# Patient Record
Sex: MALE | Race: WHITE | NOT HISPANIC OR LATINO | Employment: OTHER | URBAN - METROPOLITAN AREA
[De-identification: names, ages, dates, MRNs, and addresses within clinical notes are randomized per-mention and may not be internally consistent; named-entity substitution may affect disease eponyms.]

---

## 2023-07-04 DIAGNOSIS — I10 BENIGN ESSENTIAL HTN: Primary | ICD-10-CM

## 2023-07-04 DIAGNOSIS — K76.0 FATTY LIVER: ICD-10-CM

## 2023-07-04 DIAGNOSIS — E03.8 SUBCLINICAL HYPOTHYROIDISM: ICD-10-CM

## 2023-07-04 DIAGNOSIS — Z12.5 PROSTATE CANCER SCREENING: ICD-10-CM

## 2023-07-04 DIAGNOSIS — E78.5 HYPERLIPIDEMIA, UNSPECIFIED HYPERLIPIDEMIA TYPE: ICD-10-CM

## 2023-07-04 PROBLEM — I82.402 DEEP VEIN THROMBOSIS (DVT) OF LEFT LOWER EXTREMITY (MULTI): Status: ACTIVE | Noted: 2023-07-04

## 2023-07-04 PROBLEM — I26.99 PULMONARY EMBOLISM (MULTI): Status: ACTIVE | Noted: 2023-07-04

## 2023-07-04 PROBLEM — H40.9 GLAUCOMA: Status: ACTIVE | Noted: 2023-07-04

## 2023-07-04 PROBLEM — F41.9 ANXIETY: Status: ACTIVE | Noted: 2023-07-04

## 2023-07-04 PROBLEM — D64.9 ANEMIA: Status: ACTIVE | Noted: 2023-07-04

## 2023-07-04 RX ORDER — APIXABAN 5 MG/1
5 TABLET, FILM COATED ORAL 2 TIMES DAILY
COMMUNITY
End: 2024-01-15 | Stop reason: SDUPTHER

## 2023-07-04 RX ORDER — LISINOPRIL 10 MG/1
10 TABLET ORAL DAILY
COMMUNITY
End: 2023-07-11 | Stop reason: SDUPTHER

## 2023-07-04 RX ORDER — LATANOPROST 50 UG/ML
1 SOLUTION/ DROPS OPHTHALMIC
COMMUNITY

## 2023-07-04 RX ORDER — ATORVASTATIN CALCIUM 20 MG/1
20 TABLET, FILM COATED ORAL DAILY
Qty: 14 TABLET | Refills: 0 | Status: SHIPPED | OUTPATIENT
Start: 2023-07-04 | End: 2023-07-11 | Stop reason: SDUPTHER

## 2023-07-04 RX ORDER — ATORVASTATIN CALCIUM 20 MG/1
20 TABLET, FILM COATED ORAL DAILY
COMMUNITY
End: 2023-07-04 | Stop reason: SDUPTHER

## 2023-07-08 ENCOUNTER — LAB (OUTPATIENT)
Dept: LAB | Facility: LAB | Age: 58
End: 2023-07-08
Payer: COMMERCIAL

## 2023-07-08 DIAGNOSIS — E78.5 HYPERLIPIDEMIA, UNSPECIFIED HYPERLIPIDEMIA TYPE: ICD-10-CM

## 2023-07-08 DIAGNOSIS — Z12.5 PROSTATE CANCER SCREENING: ICD-10-CM

## 2023-07-08 DIAGNOSIS — K76.0 FATTY LIVER: ICD-10-CM

## 2023-07-08 DIAGNOSIS — E03.8 SUBCLINICAL HYPOTHYROIDISM: ICD-10-CM

## 2023-07-08 DIAGNOSIS — I10 BENIGN ESSENTIAL HTN: ICD-10-CM

## 2023-07-08 LAB
ALANINE AMINOTRANSFERASE (SGPT) (U/L) IN SER/PLAS: 18 U/L (ref 10–52)
ALBUMIN (G/DL) IN SER/PLAS: 5.1 G/DL (ref 3.4–5)
ALKALINE PHOSPHATASE (U/L) IN SER/PLAS: 51 U/L (ref 33–120)
ANION GAP IN SER/PLAS: 16 MMOL/L (ref 10–20)
APPEARANCE, URINE: CLEAR
ASPARTATE AMINOTRANSFERASE (SGOT) (U/L) IN SER/PLAS: 20 U/L (ref 9–39)
BILIRUBIN DIRECT (MG/DL) IN SER/PLAS: 0.1 MG/DL (ref 0–0.3)
BILIRUBIN TOTAL (MG/DL) IN SER/PLAS: 0.7 MG/DL (ref 0–1.2)
BILIRUBIN, URINE: NEGATIVE
BLOOD, URINE: NEGATIVE
CALCIUM (MG/DL) IN SER/PLAS: 10 MG/DL (ref 8.6–10.6)
CARBON DIOXIDE, TOTAL (MMOL/L) IN SER/PLAS: 24 MMOL/L (ref 21–32)
CHLORIDE (MMOL/L) IN SER/PLAS: 104 MMOL/L (ref 98–107)
CHOLESTEROL (MG/DL) IN SER/PLAS: 183 MG/DL (ref 0–199)
CHOLESTEROL IN HDL (MG/DL) IN SER/PLAS: 64.7 MG/DL
CHOLESTEROL/HDL RATIO: 2.8
COLOR, URINE: YELLOW
CREATININE (MG/DL) IN SER/PLAS: 0.99 MG/DL (ref 0.5–1.3)
ERYTHROCYTE DISTRIBUTION WIDTH (RATIO) BY AUTOMATED COUNT: 13.7 % (ref 11.5–14.5)
ERYTHROCYTE MEAN CORPUSCULAR HEMOGLOBIN CONCENTRATION (G/DL) BY AUTOMATED: 32.8 G/DL (ref 32–36)
ERYTHROCYTE MEAN CORPUSCULAR VOLUME (FL) BY AUTOMATED COUNT: 92 FL (ref 80–100)
ERYTHROCYTES (10*6/UL) IN BLOOD BY AUTOMATED COUNT: 5.01 X10E12/L (ref 4.5–5.9)
GFR MALE: 89 ML/MIN/1.73M2
GLUCOSE (MG/DL) IN SER/PLAS: 91 MG/DL (ref 74–99)
GLUCOSE, URINE: NEGATIVE MG/DL
HEMATOCRIT (%) IN BLOOD BY AUTOMATED COUNT: 46.1 % (ref 41–52)
HEMOGLOBIN (G/DL) IN BLOOD: 15.1 G/DL (ref 13.5–17.5)
KETONES, URINE: NEGATIVE MG/DL
LDL: 102 MG/DL (ref 0–99)
LEUKOCYTE ESTERASE, URINE: NEGATIVE
LEUKOCYTES (10*3/UL) IN BLOOD BY AUTOMATED COUNT: 9.1 X10E9/L (ref 4.4–11.3)
NITRITE, URINE: NEGATIVE
NRBC (PER 100 WBCS) BY AUTOMATED COUNT: 0 /100 WBC (ref 0–0)
PH, URINE: 5 (ref 5–8)
PLATELETS (10*3/UL) IN BLOOD AUTOMATED COUNT: 232 X10E9/L (ref 150–450)
POTASSIUM (MMOL/L) IN SER/PLAS: 4.5 MMOL/L (ref 3.5–5.3)
PROSTATE SPECIFIC ANTIGEN,SCREEN: 2.55 NG/ML (ref 0–4)
PROTEIN TOTAL: 7.4 G/DL (ref 6.4–8.2)
PROTEIN, URINE: NEGATIVE MG/DL
SODIUM (MMOL/L) IN SER/PLAS: 139 MMOL/L (ref 136–145)
SPECIFIC GRAVITY, URINE: 1.02 (ref 1–1.03)
THYROTROPIN (MIU/L) IN SER/PLAS BY DETECTION LIMIT <= 0.05 MIU/L: 2.57 MIU/L (ref 0.44–3.98)
THYROXINE (T4) FREE (NG/DL) IN SER/PLAS: 1.02 NG/DL (ref 0.78–1.48)
TRIGLYCERIDE (MG/DL) IN SER/PLAS: 84 MG/DL (ref 0–149)
TRIIODOTHYRONINE (T3) (NG/DL) IN SER/PLAS: 93 NG/DL (ref 60–200)
UREA NITROGEN (MG/DL) IN SER/PLAS: 13 MG/DL (ref 6–23)
UROBILINOGEN, URINE: <2 MG/DL (ref 0–1.9)
VLDL: 17 MG/DL (ref 0–40)

## 2023-07-08 PROCEDURE — 80048 BASIC METABOLIC PNL TOTAL CA: CPT

## 2023-07-08 PROCEDURE — 84443 ASSAY THYROID STIM HORMONE: CPT

## 2023-07-08 PROCEDURE — 84153 ASSAY OF PSA TOTAL: CPT

## 2023-07-08 PROCEDURE — 85027 COMPLETE CBC AUTOMATED: CPT

## 2023-07-08 PROCEDURE — 84480 ASSAY TRIIODOTHYRONINE (T3): CPT

## 2023-07-08 PROCEDURE — 36415 COLL VENOUS BLD VENIPUNCTURE: CPT

## 2023-07-08 PROCEDURE — 80061 LIPID PANEL: CPT

## 2023-07-08 PROCEDURE — 81003 URINALYSIS AUTO W/O SCOPE: CPT

## 2023-07-08 PROCEDURE — 80076 HEPATIC FUNCTION PANEL: CPT

## 2023-07-08 PROCEDURE — 84439 ASSAY OF FREE THYROXINE: CPT

## 2023-07-10 NOTE — PROGRESS NOTES
"Subjective   Patient ID: Jorge Cheng is a 58 y.o. male who presents for Annual Exam.    HPI   Patient's health is described as good.  Regular dental visits: Yes.  Dental hygiene (brushing/flossing) regularly performed: Yes.  Corrective lenses: Yes.  Vision problems: No.  Last eye exam within 1 year: Yes.  Hearing loss: No.  Requests audiology referral: No.  Immunizations up to date: No (declined shingrix).  Healthy diet: Yes.  Regular exercise: No.  Trying to lose weight: No.  Requests nutrition/weight loss referral: No.  Sexually active: No.  Using contraception: N/A.  Requests STD screening: No.  Colon cancer screening up to date: No (prefers cologuard).  Hepatitis C screening up to date: Yes.  Lung cancer screening up to date: No (declined CT chest).    H/O HLD, HTN.  Condition(s) stable.  Taking med(s) as directed.  Requests refills   Labs obtained prior to visit.     Review of Systems  No acute complaints.    Objective   /78   Pulse 77   Resp 14   Ht 1.613 m (5' 3.5\")   Wt 58.1 kg (128 lb)   SpO2 98%   BMI 22.32 kg/m²     Physical Exam  Constitutional:       General: He is not in acute distress.     Appearance: He is normal weight.   HENT:      Head: Normocephalic.      Left Ear: Tympanic membrane normal.      Mouth/Throat:      Pharynx: Oropharynx is clear. No oropharyngeal exudate or posterior oropharyngeal erythema.   Eyes:      Extraocular Movements: Extraocular movements intact.      Conjunctiva/sclera: Conjunctivae normal.      Pupils: Pupils are equal, round, and reactive to light.   Neck:      Thyroid: No thyromegaly.      Vascular: No carotid bruit.   Cardiovascular:      Rate and Rhythm: Normal rate and regular rhythm.      Heart sounds: Normal heart sounds. No murmur heard.     No friction rub. No gallop.   Pulmonary:      Effort: Pulmonary effort is normal.      Breath sounds: Normal breath sounds. No wheezing, rhonchi or rales.   Abdominal:      General: Bowel sounds are normal. " There is no distension.      Palpations: Abdomen is soft. There is no mass.      Tenderness: There is no abdominal tenderness. There is no guarding or rebound.   Genitourinary:     Comments: Declined prostate exam.  Lymphadenopathy:      Cervical: No cervical adenopathy.   Skin:     Coloration: Skin is not jaundiced or pale.   Neurological:      General: No focal deficit present.      Mental Status: He is oriented to person, place, and time.   Psychiatric:         Mood and Affect: Mood normal.         Behavior: Behavior normal.     Assessment/Plan   Diagnoses and all orders for this visit:  Annual physical exam  Primary hypertension  -     lisinopril 10 mg tablet; Take 1 tablet (10 mg) by mouth once daily.  Hyperlipidemia, unspecified hyperlipidemia type  -     atorvastatin (Lipitor) 20 mg tablet; Take 1 tablet (20 mg) by mouth once daily.  Deep vein thrombosis (DVT) of left lower extremity, unspecified chronicity, unspecified vein (CMS/HCC)        -     Tx by hematology  Pulmonary embolism, unspecified chronicity, unspecified pulmonary embolism type, unspecified whether acute cor pulmonale present (CMS/HCC)        -     Tx by hematology  Colon cancer screening  -     Cologuard® colon cancer screening; Future    Reviewed lab results.  Refilled medications.  Albumin slightly elevated (possibly secondary to volume depletion due to fasting status).  Cologuard ordered.    F/U 6 months: Med refills.

## 2023-07-11 ENCOUNTER — OFFICE VISIT (OUTPATIENT)
Dept: PRIMARY CARE | Facility: CLINIC | Age: 58
End: 2023-07-11
Payer: COMMERCIAL

## 2023-07-11 VITALS
DIASTOLIC BLOOD PRESSURE: 78 MMHG | HEART RATE: 77 BPM | OXYGEN SATURATION: 98 % | BODY MASS INDEX: 21.85 KG/M2 | HEIGHT: 64 IN | SYSTOLIC BLOOD PRESSURE: 116 MMHG | WEIGHT: 128 LBS | RESPIRATION RATE: 14 BRPM

## 2023-07-11 DIAGNOSIS — I82.402 DEEP VEIN THROMBOSIS (DVT) OF LEFT LOWER EXTREMITY, UNSPECIFIED CHRONICITY, UNSPECIFIED VEIN (MULTI): ICD-10-CM

## 2023-07-11 DIAGNOSIS — Z00.00 ANNUAL PHYSICAL EXAM: Primary | ICD-10-CM

## 2023-07-11 DIAGNOSIS — I26.99 PULMONARY EMBOLISM, UNSPECIFIED CHRONICITY, UNSPECIFIED PULMONARY EMBOLISM TYPE, UNSPECIFIED WHETHER ACUTE COR PULMONALE PRESENT (MULTI): ICD-10-CM

## 2023-07-11 DIAGNOSIS — Z12.11 COLON CANCER SCREENING: ICD-10-CM

## 2023-07-11 DIAGNOSIS — E78.5 HYPERLIPIDEMIA, UNSPECIFIED HYPERLIPIDEMIA TYPE: ICD-10-CM

## 2023-07-11 DIAGNOSIS — I10 PRIMARY HYPERTENSION: ICD-10-CM

## 2023-07-11 PROBLEM — D64.9 ANEMIA: Status: RESOLVED | Noted: 2023-07-04 | Resolved: 2023-07-11

## 2023-07-11 PROBLEM — E03.8 SUBCLINICAL HYPOTHYROIDISM: Status: RESOLVED | Noted: 2023-07-04 | Resolved: 2023-07-11

## 2023-07-11 PROCEDURE — 3078F DIAST BP <80 MM HG: CPT | Performed by: FAMILY MEDICINE

## 2023-07-11 PROCEDURE — 99396 PREV VISIT EST AGE 40-64: CPT | Performed by: FAMILY MEDICINE

## 2023-07-11 PROCEDURE — 99214 OFFICE O/P EST MOD 30 MIN: CPT | Performed by: FAMILY MEDICINE

## 2023-07-11 PROCEDURE — 3074F SYST BP LT 130 MM HG: CPT | Performed by: FAMILY MEDICINE

## 2023-07-11 PROCEDURE — 1036F TOBACCO NON-USER: CPT | Performed by: FAMILY MEDICINE

## 2023-07-11 RX ORDER — ATORVASTATIN CALCIUM 20 MG/1
20 TABLET, FILM COATED ORAL DAILY
Qty: 90 TABLET | Refills: 1 | Status: SHIPPED | OUTPATIENT
Start: 2023-07-11 | End: 2024-01-16 | Stop reason: SDUPTHER

## 2023-07-11 RX ORDER — LISINOPRIL 10 MG/1
10 TABLET ORAL DAILY
Qty: 90 TABLET | Refills: 1 | Status: SHIPPED | OUTPATIENT
Start: 2023-07-11 | End: 2024-01-24 | Stop reason: SDUPTHER

## 2023-07-11 ASSESSMENT — PATIENT HEALTH QUESTIONNAIRE - PHQ9
2. FEELING DOWN, DEPRESSED OR HOPELESS: NOT AT ALL
1. LITTLE INTEREST OR PLEASURE IN DOING THINGS: NOT AT ALL
SUM OF ALL RESPONSES TO PHQ9 QUESTIONS 1 AND 2: 0

## 2023-07-11 NOTE — PATIENT INSTRUCTIONS
Reviewed lab results.  Refilled medications.  Albumin slightly elevated (possibly secondary to volume depletion due to fasting status).  Cologuard ordered.    F/U 6 months: Med refills.

## 2023-09-24 PROBLEM — K76.9 LIVER LESION: Status: ACTIVE | Noted: 2023-09-24

## 2023-09-24 PROBLEM — K76.89 LIVER CYST: Status: ACTIVE | Noted: 2023-09-24

## 2023-09-24 RX ORDER — MULTIVIT-MIN/IRON FUM/FOLIC AC 7.5 MG-4
1 TABLET ORAL DAILY
COMMUNITY
Start: 2020-06-18

## 2023-09-24 RX ORDER — LANOLIN ALCOHOL/MO/W.PET/CERES
CREAM (GRAM) TOPICAL
COMMUNITY
End: 2024-01-24 | Stop reason: ALTCHOICE

## 2023-10-22 ASSESSMENT — PATIENT HEALTH QUESTIONNAIRE - PHQ9
SUM OF ALL RESPONSES TO PHQ QUESTIONS 1-9: 2
5. POOR APPETITE OR OVEREATING: NOT AT ALL
10. IF YOU CHECKED OFF ANY PROBLEMS, HOW DIFFICULT HAVE THESE PROBLEMS MADE IT FOR YOU TO DO YOUR WORK, TAKE CARE OF THINGS AT HOME, OR GET ALONG WITH OTHER PEOPLE: NOT DIFFICULT AT ALL
7. TROUBLE CONCENTRATING ON THINGS, SUCH AS READING THE NEWSPAPER OR WATCHING TELEVISION: NOT AT ALL
2. FEELING DOWN, DEPRESSED, IRRITABLE, OR HOPELESS: 0
6. FEELING BAD ABOUT YOURSELF - OR THAT YOU ARE A FAILURE OR HAVE LET YOURSELF OR YOUR FAMILY DOWN: 0
9. THOUGHTS THAT YOU WOULD BE BETTER OFF DEAD, OR OF HURTING YOURSELF: NOT AT ALL
1. LITTLE INTEREST OR PLEASURE IN DOING THINGS: NOT AT ALL
5. POOR APPETITE OR OVEREATING: 0
3. TROUBLE FALLING OR STAYING ASLEEP OR SLEEPING TOO MUCH: 1
9. THOUGHTS THAT YOU WOULD BE BETTER OFF DEAD, OR OF HURTING YOURSELF: NOT AT ALL
9. THOUGHTS THAT YOU WOULD BE BETTER OFF DEAD, OR OF HURTING YOURSELF: 0
1. LITTLE INTEREST OR PLEASURE IN DOING THINGS: NOT AT ALL
8. MOVING OR SPEAKING SO SLOWLY THAT OTHER PEOPLE COULD HAVE NOTICED. OR THE OPPOSITE, BEING SO FIGETY OR RESTLESS THAT YOU HAVE BEEN MOVING AROUND A LOT MORE THAN USUAL: NOT AT ALL
3. TROUBLE FALLING OR STAYING ASLEEP OR SLEEPING TOO MUCH: SEVERAL DAYS
4. FEELING TIRED OR HAVING LITTLE ENERGY: SEVERAL DAYS
6. FEELING BAD ABOUT YOURSELF - OR THAT YOU ARE A FAILURE OR HAVE LET YOURSELF OR YOUR FAMILY DOWN: NOT AT ALL
4. FEELING TIRED OR HAVING LITTLE ENERGY: SEVERAL DAYS
3. TROUBLE FALLING OR STAYING ASLEEP OR SLEEPING TOO MUCH: SEVERAL DAYS
SUM OF ALL RESPONSES TO PHQ QUESTIONS 1-9: 2
7. TROUBLE CONCENTRATING ON THINGS, SUCH AS READING THE NEWSPAPER OR WATCHING TELEVISION: NOT AT ALL
8. MOVING OR SPEAKING SO SLOWLY THAT OTHER PEOPLE COULD HAVE NOTICED. OR THE OPPOSITE, BEING SO FIGETY OR RESTLESS THAT YOU HAVE BEEN MOVING AROUND A LOT MORE THAN USUAL: NOT AT ALL
5. POOR APPETITE OR OVEREATING: NOT AT ALL
6. FEELING BAD ABOUT YOURSELF - OR THAT YOU ARE A FAILURE OR HAVE LET YOURSELF OR YOUR FAMILY DOWN: NOT AT ALL
7. TROUBLE CONCENTRATING ON THINGS, SUCH AS READING THE NEWSPAPER OR WATCHING TELEVISION: 0
4. FEELING TIRED OR HAVING LITTLE ENERGY: 1
1. LITTLE INTEREST OR PLEASURE IN DOING THINGS: 0
2. FEELING DOWN, DEPRESSED, IRRITABLE, OR HOPELESS: NOT AT ALL
8. MOVING OR SPEAKING SO SLOWLY THAT OTHER PEOPLE COULD HAVE NOTICED. OR THE OPPOSITE, BEING SO FIGETY OR RESTLESS THAT YOU HAVE BEEN MOVING AROUND A LOT MORE THAN USUAL: 0
2. FEELING DOWN, DEPRESSED OR HOPELESS: NOT AT ALL
10. IF YOU CHECKED OFF ANY PROBLEMS, HOW DIFFICULT HAVE THESE PROBLEMS MADE IT FOR YOU TO DO YOUR WORK, TAKE CARE OF THINGS AT HOME, OR GET ALONG WITH OTHER PEOPLE: NOT DIFFICULT AT ALL

## 2023-10-24 ENCOUNTER — OFFICE VISIT (OUTPATIENT)
Dept: HEMATOLOGY/ONCOLOGY | Facility: CLINIC | Age: 58
End: 2023-10-24
Payer: COMMERCIAL

## 2023-10-24 VITALS
TEMPERATURE: 97.2 F | WEIGHT: 127.87 LBS | SYSTOLIC BLOOD PRESSURE: 120 MMHG | DIASTOLIC BLOOD PRESSURE: 74 MMHG | HEART RATE: 66 BPM | BODY MASS INDEX: 22.3 KG/M2 | RESPIRATION RATE: 20 BRPM | OXYGEN SATURATION: 99 %

## 2023-10-24 DIAGNOSIS — I26.99 PULMONARY EMBOLISM WITHOUT ACUTE COR PULMONALE, UNSPECIFIED CHRONICITY, UNSPECIFIED PULMONARY EMBOLISM TYPE (MULTI): Primary | ICD-10-CM

## 2023-10-24 PROCEDURE — 99213 OFFICE O/P EST LOW 20 MIN: CPT | Performed by: INTERNAL MEDICINE

## 2023-10-24 PROCEDURE — 3074F SYST BP LT 130 MM HG: CPT | Performed by: INTERNAL MEDICINE

## 2023-10-24 PROCEDURE — 3078F DIAST BP <80 MM HG: CPT | Performed by: INTERNAL MEDICINE

## 2023-10-24 PROCEDURE — 1036F TOBACCO NON-USER: CPT | Performed by: INTERNAL MEDICINE

## 2023-10-24 ASSESSMENT — PAIN SCALES - GENERAL: PAINLEVEL: 0-NO PAIN

## 2023-10-24 NOTE — PROGRESS NOTES
LOCATION:  [facility].     HEMATOLOGY & ONCOLOGY PROBLEMS:  1.  Bilateral pulmonary embolism and left leg DVT in Jan 2022       a.  History of heavy alcohol use and generalized debility around that time.       b.  Hypercoagulable work-up - heterozygous factor V Leiden & prothrombin gene mutations in Oct 2022.         c.  Maintained on lifelong anticoagulation with Eliquis.     CHIEF COMPLAINT:    The patient is in the clinic today for management of PE/DVT/coagulopathy and for continuation of anticoagulation.     HISTORY:   Mr. Cheng is a 57 year old pleasant gentleman with history of small bilateral PE and left leg DVT in January 2022.  Patient apparently has a history of alcoholism and related problems.  He was having issues with binge drinking around that time and  had was feeling poorly with much decreased physical activity.  He presented to the [facility] with complaint of left leg swelling with further work-up confirming left leg DVT and small bilateral PE.  He has been maintained on anticoagulation with  Eliquis since then.  No previous history of thromboembolic complication.  Family history is unremarkable but as per the patient he is father is on prophylactic Eliquis.  He is not sure about the exact reason.  He was maintained on anticoagulation for  about 6 months.  Follow-up Doppler ultrasound showed improvement in the clot burden.  Eliquis was stopped for some time and a complete hypercoagulable work-up from October 2022 showed surprise finding of heterozygous factor V Leiden and a also prothrombin  gene mutations.  Plan is for lifelong anticoagulation.     INTERVAL HISTORY:  He denies any new complaints.  He has quit drinking completely.  No history of nausea, vomiting, fever, night sweats, diarrhea, rash, anorexia or weight loss.  As stated above complete hypercoagulable work-up results shows compound heterozygous factor  V Leiden and prothrombin gene mutations.      PAST MEDICAL HISTORY:   1.  PE/DVT as detailed above.   2.  Hypertension   3.  Anxiety/depression   4.  Glaucoma     SOCIAL HISTORY:   He is single and lives alone in [place].  Quit smoking around 2017.  1 pack/day for 25 years prior smoking history.  History of heavy alcohol abuse.  Quit drinking since February 2022.  He work as a .  Born and raised in [facility] [place].     FAMILY HISTORY:    Father age 86 in good health.  He is on prophylactic Eliquis.  Patient not aware of the exact reason.  Mother age 85 in good health.  1 sister alive and well.  He does not have any children. No other specific history of bleeding, clotting or malignant  disorder in the family.     REVIEW OF SYSTEMS:  Pertinent finding as per the history above.  All other systems have been reviewed and generally negative and noncontributory.     PHYSICAL EXAMINATION:    Detailed examination not done.     Allergies and Intolerances:       Allergies:         No Known Allergies: Active     Outpatient Medication Profile:  * Patient Currently Takes Medications as of 27-Sep-2022 10:48 documented in Structured Notes         apixaban 5 mg oral tablet : 1 tab(s) orally 2 times a day , Start Date: 27-Sep-2022         lisinopril 10 mg oral tablet: 1 tab(s) orally once a day , Start Date:  27-Jul-2022         latanoprost 0.005% ophthalmic solution: to each affected eye once a day  (at bedtime) OU         atorvastatin 20 mg oral tablet: 1 tab(s) orally once a day        Lab Results:  Last 3 sets of blood work were reviewed and the trend was noted.     Radiology Result:  [facility] LAB Venous Duplex Ultrasound for DVT [Sep 23 2022  9:11AM]  Conclusion:  CONCLUSIONS:  Right Lower Venous: No evidence of acute deep vein thrombus visualized in the right lower extremity.  Left Lower Venous: There is age indeterminate deep vein thrombosis visualized in the iliac vein. There are chronic changes visualized in the common femoral and proximal  femoral veins.  Comparison:  Compared with study from 6/22/2022, no significant change in the right leg. The thrombus in the left proximal femoral vein appears to be chronic on today exam, otherwise, no significant change.     TH CT Angio Chest for PE [Jun 22 2022  9:44AM]  Impression:  Unremarkable CT scan of the chest.     Assessment and Plan:   1.  PE/DVT. Please refer to the details of initial presentation and management as outlined above. In summary, patient with issues with intermittent heavy alcohol  abuse.  He was having episodes of binge drinking and was feeling poorly with decreased physical activity around Jan 2022.  He was evaluated at [facility] ER with complaint of left leg swelling with further work-up per confirmed left leg DVT and small  bilateral pulmonary embolism.  He has been maintained on Eliquis since then.  No previous personal history of bleeding, clotting or malignant disorder. Family history is questionable.  His father is on prophylactic Eliquis but patient is not aware of the exact reason.  He was maintained on anticoagulation for about 6 months.  Follow-up Doppler ultrasound showed improvement in the clot burden.  Eliquis was stopped for some time and a complete hypercoagulable work-up from October 2022 showed surprise findings of heterozygous factor V Leiden and a also prothrombin gene mutation.  Plan is for lifelong anticoagulation.     Long discussion with the patient and his explained about the hypercoagulable blood work results especially regarding the finding of  2 separate heterozygous mutations.  In view of the genetic results and his history of DVT, I think he is at increased  risk for future complication.  Recommendations are for lifelong anticoagulation.  He is well aware of the slight increased risk of bleeding and other complications with anticoagulation.  He will also share the results with his sister.     2. Follow up:  Patient will return to the clinic in 1 year.   Advised to contact us immediately if there are any new questions or problems.     This note has been transcribed using Dragon voice recognition system and there is a possibility of unintentional typing misprints.

## 2023-10-24 NOTE — LETTER
October 24, 2023       No Recipients    Patient: Jorge Cheng   YOB: 1965   Date of Visit: 10/24/2023       Dear Dr. Mortensen Recipients:    Thank you for referring Jorge Cheng to me for evaluation. Below are my notes for this consultation.  If you have questions, please do not hesitate to call me. I look forward to following your patient along with you.       Sincerely,     Demetrio Canela MD      CC:   No Recipients  ______________________________________________________________________________________    LOCATION:  [facility].     HEMATOLOGY & ONCOLOGY PROBLEMS:  1.  Bilateral pulmonary embolism and left leg DVT in Jan 2022       a.  History of heavy alcohol use and generalized debility around that time.       b.  Hypercoagulable work-up - heterozygous factor V Leiden & prothrombin gene mutations in Oct 2022.         c.  Maintained on lifelong anticoagulation with Eliquis.     CHIEF COMPLAINT:    The patient is in the clinic today for management of PE/DVT/coagulopathy and for continuation of anticoagulation.     HISTORY:   Mr. Cheng is a 57 year old pleasant gentleman with history of small bilateral PE and left leg DVT in January 2022.  Patient apparently has a history of alcoholism and related problems.  He was having issues with binge drinking around that time and  had was feeling poorly with much decreased physical activity.  He presented to the [facility] with complaint of left leg swelling with further work-up confirming left leg DVT and small bilateral PE.  He has been maintained on anticoagulation with  Eliquis since then.  No previous history of thromboembolic complication.  Family history is unremarkable but as per the patient he is father is on prophylactic Eliquis.  He is not sure about the exact reason.  He was maintained on anticoagulation for  about 6 months.  Follow-up Doppler ultrasound showed improvement in the clot burden.  Eliquis was  stopped for some time and a complete hypercoagulable work-up from October 2022 showed surprise finding of heterozygous factor V Leiden and a also prothrombin  gene mutations.  Plan is for lifelong anticoagulation.     INTERVAL HISTORY:  He denies any new complaints.  He has quit drinking completely.  No history of nausea, vomiting, fever, night sweats, diarrhea, rash, anorexia or weight loss.  As stated above complete hypercoagulable work-up results shows compound heterozygous factor  V Leiden and prothrombin gene mutations.     PAST MEDICAL HISTORY:   1.  PE/DVT as detailed above.   2.  Hypertension   3.  Anxiety/depression   4.  Glaucoma     SOCIAL HISTORY:   He is single and lives alone in [place].  Quit smoking around 2017.  1 pack/day for 25 years prior smoking history.  History of heavy alcohol abuse.  Quit drinking since February 2022.  He work as a .  Born and raised in [facility].     FAMILY HISTORY:    Father age 86 in good health.  He is on prophylactic Eliquis.  Patient not aware of the exact reason.  Mother age 85 in good health.  1 sister alive and well.  He does not have any children. No other specific history of bleeding, clotting or malignant  disorder in the family.     REVIEW OF SYSTEMS:  Pertinent finding as per the history above.  All other systems have been reviewed and generally negative and noncontributory.     PHYSICAL EXAMINATION:    Detailed examination not done.     Allergies and Intolerances:       Allergies:         No Known Allergies: Active     Outpatient Medication Profile:  * Patient Currently Takes Medications as of 27-Sep-2022 10:48 documented in Structured Notes         apixaban 5 mg oral tablet : 1 tab(s) orally 2 times a day , Start Date: 27-Sep-2022         lisinopril 10 mg oral tablet: 1 tab(s) orally once a day , Start Date:  27-Jul-2022         latanoprost 0.005% ophthalmic solution: to each affected eye once a day  (at bedtime) OU          atorvastatin 20 mg oral tablet: 1 tab(s) orally once a day        Lab Results:  Last 3 sets of blood work were reviewed and the trend was noted.     Radiology Result:  [facility] LAB Venous Duplex Ultrasound for DVT [Sep 23 2022  9:11AM]  Conclusion:  CONCLUSIONS:  Right Lower Venous: No evidence of acute deep vein thrombus visualized in the right lower extremity.  Left Lower Venous: There is age indeterminate deep vein thrombosis visualized in the iliac vein. There are chronic changes visualized in the common femoral and proximal femoral veins.  Comparison:  Compared with study from 6/22/2022, no significant change in the right leg. The thrombus in the left proximal femoral vein appears to be chronic on today exam, otherwise, no significant change.     TH CT Angio Chest for PE [Jun 22 2022  9:44AM]  Impression:  Unremarkable CT scan of the chest.     Assessment and Plan:   1.  PE/DVT. Please refer to the details of initial presentation and management as outlined above. In summary, patient with issues with intermittent heavy alcohol  abuse.  He was having episodes of binge drinking and was feeling poorly with decreased physical activity around Jan 2022.  He was evaluated at [facility] ER with complaint of left leg swelling with further work-up per confirmed left leg DVT and small  bilateral pulmonary embolism.  He has been maintained on Eliquis since then.  No previous personal history of bleeding, clotting or malignant disorder. Family history is questionable.  His father is on prophylactic Eliquis but patient is not aware of the exact reason.  He was maintained on anticoagulation for about 6 months.  Follow-up Doppler ultrasound showed improvement in the clot burden.  Eliquis was stopped for some time and a complete hypercoagulable work-up from October 2022 showed surprise findings of heterozygous factor V Leiden and a also prothrombin gene mutation.  Plan is for lifelong anticoagulation.     Long discussion with  the patient and his explained about the hypercoagulable blood work results especially regarding the finding of  2 separate heterozygous mutations.  In view of the genetic results and his history of DVT, I think he is at increased  risk for future complication.  Recommendations are for lifelong anticoagulation.  He is well aware of the slight increased risk of bleeding and other complications with anticoagulation.  He will also share the results with his sister.     2. Follow up:  Patient will return to the clinic in 1 year.  Advised to contact us immediately if there are any new questions or problems.     This note has been transcribed using Dragon voice recognition system and there is a possibility of unintentional typing misprints.

## 2024-01-15 DIAGNOSIS — I82.402 DEEP VEIN THROMBOSIS (DVT) OF LEFT LOWER EXTREMITY, UNSPECIFIED CHRONICITY, UNSPECIFIED VEIN (MULTI): ICD-10-CM

## 2024-01-15 RX ORDER — APIXABAN 5 MG/1
5 TABLET, FILM COATED ORAL 2 TIMES DAILY
Qty: 180 TABLET | Refills: 3 | Status: SHIPPED | OUTPATIENT
Start: 2024-01-15

## 2024-01-16 ENCOUNTER — TELEPHONE (OUTPATIENT)
Dept: PRIMARY CARE | Facility: CLINIC | Age: 59
End: 2024-01-16
Payer: COMMERCIAL

## 2024-01-16 DIAGNOSIS — E78.5 HYPERLIPIDEMIA, UNSPECIFIED HYPERLIPIDEMIA TYPE: ICD-10-CM

## 2024-01-16 RX ORDER — ATORVASTATIN CALCIUM 20 MG/1
20 TABLET, FILM COATED ORAL DAILY
Qty: 7 TABLET | Refills: 0 | Status: SHIPPED | OUTPATIENT
Start: 2024-01-16 | End: 2024-01-24 | Stop reason: SDUPTHER

## 2024-01-16 NOTE — TELEPHONE ENCOUNTER
Pt called and scheduled 1/24 appt.  Pt would like a refill of     Atorvastin  20 mg  1 daily  5 left  [facility]

## 2024-01-24 ENCOUNTER — OFFICE VISIT (OUTPATIENT)
Dept: PRIMARY CARE | Facility: CLINIC | Age: 59
End: 2024-01-24
Payer: COMMERCIAL

## 2024-01-24 VITALS
BODY MASS INDEX: 22.13 KG/M2 | HEART RATE: 77 BPM | SYSTOLIC BLOOD PRESSURE: 110 MMHG | OXYGEN SATURATION: 98 % | WEIGHT: 129.6 LBS | RESPIRATION RATE: 16 BRPM | TEMPERATURE: 97.6 F | HEIGHT: 64 IN | DIASTOLIC BLOOD PRESSURE: 73 MMHG

## 2024-01-24 DIAGNOSIS — F41.9 ANXIETY: ICD-10-CM

## 2024-01-24 DIAGNOSIS — G47.00 INSOMNIA, UNSPECIFIED TYPE: ICD-10-CM

## 2024-01-24 DIAGNOSIS — I10 PRIMARY HYPERTENSION: Primary | ICD-10-CM

## 2024-01-24 DIAGNOSIS — E78.5 HYPERLIPIDEMIA, UNSPECIFIED HYPERLIPIDEMIA TYPE: ICD-10-CM

## 2024-01-24 DIAGNOSIS — E88.09 HYPERALBUMINEMIA: ICD-10-CM

## 2024-01-24 PROCEDURE — 1036F TOBACCO NON-USER: CPT | Performed by: FAMILY MEDICINE

## 2024-01-24 PROCEDURE — 99214 OFFICE O/P EST MOD 30 MIN: CPT | Performed by: FAMILY MEDICINE

## 2024-01-24 PROCEDURE — 3078F DIAST BP <80 MM HG: CPT | Performed by: FAMILY MEDICINE

## 2024-01-24 PROCEDURE — 3074F SYST BP LT 130 MM HG: CPT | Performed by: FAMILY MEDICINE

## 2024-01-24 RX ORDER — ATORVASTATIN CALCIUM 20 MG/1
20 TABLET, FILM COATED ORAL DAILY
Qty: 90 TABLET | Refills: 1 | Status: SHIPPED | OUTPATIENT
Start: 2024-01-24

## 2024-01-24 RX ORDER — HYDROXYZINE PAMOATE 50 MG/1
50 CAPSULE ORAL 3 TIMES DAILY PRN
Qty: 30 CAPSULE | Refills: 0 | Status: SHIPPED | OUTPATIENT
Start: 2024-01-24 | End: 2024-03-13 | Stop reason: SDUPTHER

## 2024-01-24 RX ORDER — LISINOPRIL 10 MG/1
10 TABLET ORAL DAILY
Qty: 90 TABLET | Refills: 1 | Status: SHIPPED | OUTPATIENT
Start: 2024-01-24

## 2024-01-24 NOTE — PROGRESS NOTES
"Subjective   Patient ID: Jorge Cheng is a 58 y.o. male who presents for Med Refill.    HPI  H/O HLD, HTN.  Condition(s) stable.  Taking med(s) as directed.  Requests refills.    Requests something mild for anxiety and insomnia.  Father has advanced dementia and it can be overwhelming at times, causing some anxiety and insomnia.    Review of Systems  No other complaints.     Objective   /73   Pulse 77   Temp 36.4 °C (97.6 °F)   Resp 16   Ht 1.613 m (5' 3.5\")   Wt 58.8 kg (129 lb 9.6 oz)   SpO2 98%   BMI 22.60 kg/m²     Physical Exam  Constitutional:       General: He is not in acute distress.     Appearance: He is normal weight.   Cardiovascular:      Rate and Rhythm: Normal rate and regular rhythm.      Heart sounds: Normal heart sounds. No murmur heard.     No friction rub. No gallop.   Pulmonary:      Effort: Pulmonary effort is normal.      Breath sounds: Normal breath sounds. No wheezing, rhonchi or rales.   Neurological:      Mental Status: He is oriented to person, place, and time.   Psychiatric:         Mood and Affect: Mood normal.         Behavior: Behavior normal.     Assessment/Plan   Diagnoses and all orders for this visit:  Primary hypertension  -     Basic Metabolic Panel; Future  -     lisinopril 10 mg tablet; Take 1 tablet (10 mg) by mouth once daily.  Hyperlipidemia, unspecified hyperlipidemia type  -     atorvastatin (Lipitor) 20 mg tablet; Take 1 tablet (20 mg) by mouth once daily.  Anxiety  -     hydrOXYzine pamoate (VistariL) 50 mg capsule; Take 1 capsule (50 mg) by mouth 3 times a day as needed (anxiety, insomnia).  Insomnia, unspecified type  -     hydrOXYzine pamoate (VistariL) 50 mg capsule; Take 1 capsule (50 mg) by mouth 3 times a day as needed (anxiety, insomnia).  Hyperalbuminemia  -     Hepatic function panel; Future    Nonfasting labs (make sure adequately hydrated).  Refilled medications.    Hydroxyzine provided as needed.  Call, send message through [facility] or " return to office prn if these symptoms worsen or fail to improve as anticipated.     F/U 6 months: Annual wellness visit.

## 2024-01-24 NOTE — PATIENT INSTRUCTIONS
Nonfasting labs (make sure adequately hydrated).  Refilled medications.    Hydroxyzine provided as needed.  Call, send message through [place], or return to office prn if these symptoms worsen or fail to improve as anticipated.     F/U 6 months: Annual wellness visit.

## 2024-03-13 DIAGNOSIS — G47.00 INSOMNIA, UNSPECIFIED TYPE: ICD-10-CM

## 2024-03-13 DIAGNOSIS — F41.9 ANXIETY: ICD-10-CM

## 2024-03-13 RX ORDER — HYDROXYZINE PAMOATE 50 MG/1
50 CAPSULE ORAL 3 TIMES DAILY PRN
Qty: 90 CAPSULE | Refills: 0 | Status: SHIPPED | OUTPATIENT
Start: 2024-03-13

## 2024-07-24 ENCOUNTER — TELEPHONE (OUTPATIENT)
Dept: PRIMARY CARE | Facility: CLINIC | Age: 59
End: 2024-07-24
Payer: COMMERCIAL

## 2024-07-24 NOTE — TELEPHONE ENCOUNTER
Medication Name: Atorvastatin   Dose: 20 mg tablet   Frequency: Take 1 tablet by mouth once daily   Pharmacy: [facility]   Quantity left: 3 days left   Last appointment: 1/24/24  Last CPE:  Last MCW:  Next appointment: 8/7/24  Next CPE:  Next MCW:

## 2024-07-25 DIAGNOSIS — E78.5 HYPERLIPIDEMIA, UNSPECIFIED HYPERLIPIDEMIA TYPE: ICD-10-CM

## 2024-07-25 RX ORDER — ATORVASTATIN CALCIUM 20 MG/1
20 TABLET, FILM COATED ORAL DAILY
Qty: 14 TABLET | Refills: 0 | Status: SHIPPED | OUTPATIENT
Start: 2024-07-25

## 2024-08-07 ENCOUNTER — LAB (OUTPATIENT)
Dept: LAB | Facility: LAB | Age: 59
End: 2024-08-07
Payer: COMMERCIAL

## 2024-08-07 ENCOUNTER — APPOINTMENT (OUTPATIENT)
Dept: PRIMARY CARE | Facility: CLINIC | Age: 59
End: 2024-08-07
Payer: COMMERCIAL

## 2024-08-07 VITALS
SYSTOLIC BLOOD PRESSURE: 111 MMHG | DIASTOLIC BLOOD PRESSURE: 74 MMHG | WEIGHT: 128.8 LBS | HEART RATE: 74 BPM | HEIGHT: 64 IN | BODY MASS INDEX: 21.99 KG/M2 | OXYGEN SATURATION: 98 % | RESPIRATION RATE: 16 BRPM

## 2024-08-07 DIAGNOSIS — E78.5 HYPERLIPIDEMIA, UNSPECIFIED HYPERLIPIDEMIA TYPE: ICD-10-CM

## 2024-08-07 DIAGNOSIS — I10 PRIMARY HYPERTENSION: Primary | ICD-10-CM

## 2024-08-07 DIAGNOSIS — I10 PRIMARY HYPERTENSION: ICD-10-CM

## 2024-08-07 DIAGNOSIS — E88.09 HYPERALBUMINEMIA: ICD-10-CM

## 2024-08-07 LAB
ALBUMIN SERPL BCP-MCNC: 4.9 G/DL (ref 3.4–5)
ALP SERPL-CCNC: 61 U/L (ref 33–120)
ALT SERPL W P-5'-P-CCNC: 18 U/L (ref 10–52)
ANION GAP SERPL CALC-SCNC: 15 MMOL/L (ref 10–20)
AST SERPL W P-5'-P-CCNC: 20 U/L (ref 9–39)
BILIRUB DIRECT SERPL-MCNC: 0.2 MG/DL (ref 0–0.3)
BILIRUB SERPL-MCNC: 0.9 MG/DL (ref 0–1.2)
BUN SERPL-MCNC: 13 MG/DL (ref 6–23)
CALCIUM SERPL-MCNC: 10 MG/DL (ref 8.6–10.6)
CHLORIDE SERPL-SCNC: 101 MMOL/L (ref 98–107)
CO2 SERPL-SCNC: 26 MMOL/L (ref 21–32)
CREAT SERPL-MCNC: 1.16 MG/DL (ref 0.5–1.3)
EGFRCR SERPLBLD CKD-EPI 2021: 73 ML/MIN/1.73M*2
GLUCOSE SERPL-MCNC: 82 MG/DL (ref 74–99)
POTASSIUM SERPL-SCNC: 4.4 MMOL/L (ref 3.5–5.3)
PROT SERPL-MCNC: 7.3 G/DL (ref 6.4–8.2)
SODIUM SERPL-SCNC: 138 MMOL/L (ref 136–145)

## 2024-08-07 PROCEDURE — 3074F SYST BP LT 130 MM HG: CPT | Performed by: FAMILY MEDICINE

## 2024-08-07 PROCEDURE — 3078F DIAST BP <80 MM HG: CPT | Performed by: FAMILY MEDICINE

## 2024-08-07 PROCEDURE — 36415 COLL VENOUS BLD VENIPUNCTURE: CPT

## 2024-08-07 PROCEDURE — 3008F BODY MASS INDEX DOCD: CPT | Performed by: FAMILY MEDICINE

## 2024-08-07 PROCEDURE — 1036F TOBACCO NON-USER: CPT | Performed by: FAMILY MEDICINE

## 2024-08-07 PROCEDURE — 80053 COMPREHEN METABOLIC PANEL: CPT

## 2024-08-07 PROCEDURE — 82248 BILIRUBIN DIRECT: CPT

## 2024-08-07 PROCEDURE — 99214 OFFICE O/P EST MOD 30 MIN: CPT | Performed by: FAMILY MEDICINE

## 2024-08-07 NOTE — PATIENT INSTRUCTIONS
Get nonfasting labs as previously ordered.  Will refill medications (90 days) after reviewing lab results.    F/U 3 months: Annual wellness visit.    Lab services: [address]  Hours: M-F 6:30a-6p, Sat 8a-12p  Phone: [phone], Option 1

## 2024-08-07 NOTE — PROGRESS NOTES
"Subjective   Patient ID: Jorge Cheng is a 59 y.o. male who presents for Med Refill.    HPI  Has HLD, HTN.  Condition(s) stable.  Taking med(s) as directed.  Requests refills.    Did not return for labs (BMP, LFTs) as advised 7 months ago.    Due for annual wellness visit.    Review of Systems  No other complaints.     Objective   /74   Pulse 74   Resp 16   Ht 1.613 m (5' 3.5\")   Wt 58.4 kg (128 lb 12.8 oz)   SpO2 98%   BMI 22.46 kg/m²     Physical Exam  Constitutional:       General: He is not in acute distress.     Appearance: He is normal weight.   Cardiovascular:      Rate and Rhythm: Normal rate and regular rhythm.      Heart sounds: Normal heart sounds. No murmur heard.     No friction rub. No gallop.   Pulmonary:      Effort: Pulmonary effort is normal.      Breath sounds: Normal breath sounds. No wheezing, rhonchi or rales.   Neurological:      Mental Status: He is oriented to person, place, and time.   Psychiatric:         Mood and Affect: Mood normal.         Behavior: Behavior normal.     Assessment/Plan   Diagnoses and all orders for this visit:  Primary hypertension  Hyperlipidemia, unspecified hyperlipidemia type    Get nonfasting labs as previously ordered.  Will refill medications (90 days) after reviewing lab results.    F/U 3 months: Annual wellness visit.  "

## 2024-08-12 DIAGNOSIS — E78.5 HYPERLIPIDEMIA, UNSPECIFIED HYPERLIPIDEMIA TYPE: ICD-10-CM

## 2024-08-12 DIAGNOSIS — I10 PRIMARY HYPERTENSION: ICD-10-CM

## 2024-08-12 RX ORDER — LISINOPRIL 10 MG/1
10 TABLET ORAL DAILY
Qty: 90 TABLET | Refills: 0 | Status: SHIPPED | OUTPATIENT
Start: 2024-08-12

## 2024-08-12 RX ORDER — ATORVASTATIN CALCIUM 20 MG/1
20 TABLET, FILM COATED ORAL DAILY
Qty: 90 TABLET | Refills: 0 | Status: SHIPPED | OUTPATIENT
Start: 2024-08-12

## 2024-10-22 ENCOUNTER — OFFICE VISIT (OUTPATIENT)
Dept: HEMATOLOGY/ONCOLOGY | Facility: CLINIC | Age: 59
End: 2024-10-22
Payer: COMMERCIAL

## 2024-10-22 VITALS
DIASTOLIC BLOOD PRESSURE: 51 MMHG | SYSTOLIC BLOOD PRESSURE: 112 MMHG | WEIGHT: 129.63 LBS | OXYGEN SATURATION: 100 % | BODY MASS INDEX: 22.6 KG/M2 | RESPIRATION RATE: 20 BRPM | TEMPERATURE: 97.7 F

## 2024-10-22 DIAGNOSIS — I26.99 PULMONARY EMBOLISM WITHOUT ACUTE COR PULMONALE, UNSPECIFIED CHRONICITY, UNSPECIFIED PULMONARY EMBOLISM TYPE (MULTI): Primary | ICD-10-CM

## 2024-10-22 DIAGNOSIS — D68.52 HETEROZYGOUS FOR PROTHROMBIN G20210A MUTATION (MULTI): ICD-10-CM

## 2024-10-22 DIAGNOSIS — D68.51 HETEROZYGOUS FACTOR V LEIDEN MUTATION (MULTI): ICD-10-CM

## 2024-10-22 PROCEDURE — 3074F SYST BP LT 130 MM HG: CPT | Performed by: INTERNAL MEDICINE

## 2024-10-22 PROCEDURE — 3078F DIAST BP <80 MM HG: CPT | Performed by: INTERNAL MEDICINE

## 2024-10-22 PROCEDURE — 99213 OFFICE O/P EST LOW 20 MIN: CPT | Performed by: INTERNAL MEDICINE

## 2024-10-22 ASSESSMENT — PAIN SCALES - GENERAL: PAINLEVEL_OUTOF10: 0-NO PAIN

## 2024-10-22 NOTE — PROGRESS NOTES
LOCATION:  [facility] at [facility].     HEMATOLOGY & ONCOLOGY PROBLEMS:  1.  Bilateral pulmonary embolism and left leg DVT in Jan 2022       a.  History of heavy alcohol use and generalized debility around that time.       b.  Hypercoagulable work-up - heterozygous factor V Leiden & prothrombin gene mutations in Oct 2022.         c.   Maintained on lifelong anticoagulation with Eliquis.     CHIEF COMPLAINT:    The patient is in the clinic today for management of PE/DVT/coagulopathy and for continuation of anticoagulation.     HISTORY:   Mr. Cheng is a 59 year old pleasant gentleman with history of small bilateral PE and left leg DVT in Jan 2022.  He apparently has a history of alcoholism and related problems.  He was having issues with binge drinking around that time and  had was feeling poorly with much decreased physical activity.  He presented to the [facility] with complaint of left leg swelling with further work-up confirming left leg DVT and small bilateral PE.  He has been maintained on anticoagulation with Eliquis since then.  No previous history of thromboembolic complication.  Family history is unremarkable but as per the patient his father was on prophylactic Eliquis, probably cardiac reasons.  He is not sure about the exact details.   Follow-up Doppler ultrasound from Sep 2022, showed improvement in the clot burden.  Eliquis was stopped for some time and a complete hypercoagulable work-up from October 2022 showed surprise finding of heterozygous factor V Leiden and a also prothrombin gene mutations.  Plan is for lifelong anticoagulation.     INTERVAL HISTORY:  He denies any new complaints.  He has quit drinking completely.  No history of nausea, vomiting, fever, night sweats, diarrhea, rash, anorexia or weight loss.  As stated above complete hypercoagulable work-up results showed compound heterozygous factor  V Leiden and prothrombin gene mutations.     PAST MEDICAL HISTORY:   1.   PE/DVT as detailed above.   2.  Hypertension   3.  Anxiety/depression   4.  Glaucoma     SOCIAL HISTORY:   He is single and lives alone in [place].  Quit smoking around 2017.  1 pack/day for 25 years prior smoking history. History of heavy alcohol abuse.  Quit drinking since 2022.  He work as a .  Born and raised in [facility].     FAMILY HISTORY:    Father  at age 88 from dementia related complications.  He was on prophylactic Eliquis - mainly for cardiac reasons.  Mother age 87 in good health.  1 sister ~ alive and well.  He does not have any children. No other specific history of bleeding, clotting or malignant  disorder in the family.     REVIEW OF SYSTEMS:  Pertinent finding as per the history above.  All other systems have been reviewed and generally negative and noncontributory.     ALLERGY & MEDICATIONS:  Allergies and latest outpatient medications list were reviewed in the EMR.    VITAL SIGNS  BSA: 1.62 meters squared  /51   Temp 36.5 °C (97.7 °F)   Resp 20   Wt 58.8 kg (129 lb 10.1 oz)   SpO2 100%   BMI 22.60 kg/m²     PHYSICAL EXAMINATION:  Detailed examination not done.    LAB RESULTS:  Last 3 sets of blood work were reviewed and the trend was noted.     RADIOLOGY RESULT:  [facility] LAB Venous Duplex Ultrasound for DVT [Sep 23 2022  9:11AM]  Conclusion:  CONCLUSIONS:  Right Lower Venous: No evidence of acute deep vein thrombus visualized in the right lower extremity.  Left Lower Venous: There is age indeterminate deep vein thrombosis visualized in the iliac vein. There are chronic changes visualized in the common femoral and proximal femoral veins.  Comparison:  Compared with study from 2022, no significant change in the right leg. The thrombus in the left proximal femoral vein appears to be chronic on today exam, otherwise, no significant change.     TH CT Angio Chest for PE [2022]  Impression:  Unremarkable CT scan of the chest.     ASSESSMENT AND  PLAN:   1.  PE/DVT. Please refer to the details of initial presentation and management as outlined above. In summary, patient with issues with intermittent heavy alcohol  abuse.  He was having episodes of binge drinking and was feeling poorly with decreased physical activity around Jan 2022.  He was evaluated at [facility] with complaint of left leg swelling,  further work-up per confirmed left leg DVT and small bilateral pulmonary embolism.  He has been maintained on Eliquis since then.  No previous personal history of bleeding, clotting or malignant disorder. Family history is questionable.  His father is on prophylactic Eliquis but patient is not aware of the exact reason.  He was maintained on anticoagulation for about 6 months.  Follow-up Doppler ultrasound showed improvement in the clot burden.  Eliquis was stopped for some time and a complete hypercoagulable work-up from October 2022 showed surprise findings of heterozygous factor V Leiden and a also prothrombin gene mutation.  Plan is for lifelong anticoagulation.     Again, long discussion with the patient and he is explained about the hypercoagulable blood work results especially  the finding of  2 separate heterozygous mutations.  In view of the genetic results and his history of PE/DVT, I think he is at increased risk for future complications.  Recommendations are for lifelong anticoagulation.  He is aware of the slight increased risk of bleeding and other complications with anticoagulation.  I again advised him to share the hypercoagulable results with his sister.     2. Follow up:  Patient will return to the clinic in 1 year.  Advised to contact us immediately if there are any new questions or problems.     This note has been transcribed using Dragon voice recognition system and there is a possibility of unintentional typing misprints.

## 2024-11-04 ENCOUNTER — APPOINTMENT (OUTPATIENT)
Dept: PRIMARY CARE | Facility: CLINIC | Age: 59
End: 2024-11-04
Payer: COMMERCIAL

## 2024-11-04 VITALS
HEIGHT: 64 IN | BODY MASS INDEX: 22.33 KG/M2 | RESPIRATION RATE: 16 BRPM | SYSTOLIC BLOOD PRESSURE: 101 MMHG | OXYGEN SATURATION: 100 % | DIASTOLIC BLOOD PRESSURE: 65 MMHG | WEIGHT: 130.8 LBS | HEART RATE: 63 BPM

## 2024-11-04 DIAGNOSIS — I10 PRIMARY HYPERTENSION: ICD-10-CM

## 2024-11-04 DIAGNOSIS — Z12.5 PROSTATE CANCER SCREENING: ICD-10-CM

## 2024-11-04 DIAGNOSIS — Z00.00 ANNUAL PHYSICAL EXAM: Primary | ICD-10-CM

## 2024-11-04 DIAGNOSIS — F17.219 CIGARETTE NICOTINE DEPENDENCE WITH NICOTINE-INDUCED DISORDER: ICD-10-CM

## 2024-11-04 DIAGNOSIS — Z12.11 COLON CANCER SCREENING: ICD-10-CM

## 2024-11-04 DIAGNOSIS — K76.0 FATTY LIVER: ICD-10-CM

## 2024-11-04 DIAGNOSIS — E78.5 HYPERLIPIDEMIA, UNSPECIFIED HYPERLIPIDEMIA TYPE: ICD-10-CM

## 2024-11-04 PROCEDURE — 99214 OFFICE O/P EST MOD 30 MIN: CPT | Performed by: FAMILY MEDICINE

## 2024-11-04 PROCEDURE — 1036F TOBACCO NON-USER: CPT | Performed by: FAMILY MEDICINE

## 2024-11-04 PROCEDURE — 3074F SYST BP LT 130 MM HG: CPT | Performed by: FAMILY MEDICINE

## 2024-11-04 PROCEDURE — 3008F BODY MASS INDEX DOCD: CPT | Performed by: FAMILY MEDICINE

## 2024-11-04 PROCEDURE — 3078F DIAST BP <80 MM HG: CPT | Performed by: FAMILY MEDICINE

## 2024-11-04 PROCEDURE — 99396 PREV VISIT EST AGE 40-64: CPT | Performed by: FAMILY MEDICINE

## 2024-11-04 RX ORDER — ATORVASTATIN CALCIUM 20 MG/1
20 TABLET, FILM COATED ORAL DAILY
Qty: 7 TABLET | Refills: 0 | Status: SHIPPED | OUTPATIENT
Start: 2024-11-04 | End: 2024-11-07 | Stop reason: SDUPTHER

## 2024-11-04 RX ORDER — LISINOPRIL 5 MG/1
5 TABLET ORAL DAILY
Qty: 90 TABLET | Refills: 1 | Status: SHIPPED | OUTPATIENT
Start: 2024-11-04

## 2024-11-04 ASSESSMENT — ENCOUNTER SYMPTOMS
OCCASIONAL FEELINGS OF UNSTEADINESS: 0
LOSS OF SENSATION IN FEET: 0
DEPRESSION: 0

## 2024-11-04 ASSESSMENT — PATIENT HEALTH QUESTIONNAIRE - PHQ9
1. LITTLE INTEREST OR PLEASURE IN DOING THINGS: NOT AT ALL
SUM OF ALL RESPONSES TO PHQ9 QUESTIONS 1 AND 2: 1
2. FEELING DOWN, DEPRESSED OR HOPELESS: SEVERAL DAYS

## 2024-11-04 NOTE — PROGRESS NOTES
"Subjective   Patient ID: Jorge Cheng is a 59 y.o. male who presents for Annual Exam.    HPI   Patient's health is described as good.  Regular dental visits: No.  Dental hygiene (brushing/flossing) regularly performed: Yes.  Corrective lenses: Yes.  Vision problems: Yes (glaucoma).  Last eye exam within 1 year: Yes.  Hearing loss: No.  Requests audiology referral: No.  Immunizations up to date: No (declined shingrix, flu).   Healthy diet: Yes.  Regular exercise: Somewhat.  Trying to lose weight: No.  Requests nutrition/weight loss referral: No.  Sexually active: No.  Using contraception: N/A.  Requests STD screening: No.  Colon cancer screening up to date: No (prefers cologuard).  Lung cancer screening up to date: No.   Hepatitis C screening up to date: Yes.    Has HLD, HTN.  Taking med(s) as directed.  Requests refills.    Reviewed/Updated Active problem list, PMH, PSH, FH, SH, Meds, Allergies.    Review of Systems  Low back pain.  Will make appt to discuss if it persists.    Objective   /65   Pulse 63   Resp 16   Ht 1.613 m (5' 3.5\")   Wt 59.3 kg (130 lb 12.8 oz)   SpO2 100%   BMI 22.81 kg/m²     Physical Exam  Constitutional:       General: He is not in acute distress.     Appearance: He is normal weight.   HENT:      Head: Normocephalic.      Right Ear: Tympanic membrane normal.      Left Ear: Tympanic membrane normal.      Mouth/Throat:      Pharynx: Oropharynx is clear. No oropharyngeal exudate or posterior oropharyngeal erythema.   Eyes:      Extraocular Movements: Extraocular movements intact.      Conjunctiva/sclera: Conjunctivae normal.      Pupils: Pupils are equal, round, and reactive to light.   Neck:      Thyroid: No thyromegaly.      Vascular: No carotid bruit.   Cardiovascular:      Rate and Rhythm: Normal rate and regular rhythm.      Heart sounds: Normal heart sounds. No murmur heard.     No friction rub. No gallop.   Pulmonary:      Effort: Pulmonary effort is normal.      Breath " sounds: Normal breath sounds. No wheezing, rhonchi or rales.   Abdominal:      General: Bowel sounds are normal. There is no distension.      Palpations: Abdomen is soft. There is no mass.      Tenderness: There is no abdominal tenderness. There is no guarding or rebound.   Genitourinary:     Comments: Declined prostate exam   Lymphadenopathy:      Cervical: No cervical adenopathy.   Skin:     Coloration: Skin is not jaundiced or pale.   Neurological:      General: No focal deficit present.      Mental Status: He is oriented to person, place, and time.   Psychiatric:         Mood and Affect: Mood normal.         Behavior: Behavior normal.     Assessment/Plan   Diagnoses and all orders for this visit:  Annual physical exam  Primary hypertension  -     CBC; Future  -     Basic Metabolic Panel; Future  -     Urinalysis with Reflex Culture and Microscopic; Future  -     lisinopril 5 mg tablet; Take 1 tablet (5 mg) by mouth once daily.  Hyperlipidemia, unspecified hyperlipidemia type  -     Lipid Panel; Future  -     atorvastatin (Lipitor) 20 mg tablet; Take 1 tablet (20 mg) by mouth once daily.  Fatty liver  -     US abdomen limited liver; Future  Prostate cancer screening  -     Prostate Specific Antigen, Screen; Future  Colon cancer screening  -     Cologuard® colon cancer screening; Future  Cigarette nicotine dependence with nicotine-induced disorder  -     CT lung screening low dose; Future      Fasting labs.  Lisinopril decreased.  Limited amount of Atorvastatin provided (will provide normal amount after reviewing lab results).  Liver ultrasound ordered.  CT chest for lung cancer screening.  Cologuard ordered (call [phone] if any questions).   Recommend routine dental visits.     F/U 6 months: Med refills.

## 2024-11-04 NOTE — PATIENT INSTRUCTIONS
Fasting labs.  Lisinopril decreased.  Limited amount of Atorvastatin provided (will provide normal amount after reviewing lab results).  Liver ultrasound ordered.  CT chest for lung cancer screening.  Cologuard ordered (call [phone] if any questions).   Recommend routine dental visits.     F/U 6 months: Med refills.    Lab services: [address]  Hours: M-F 6:30a-6p, Sat 8a-12p  Phone: [phone], Option 1

## 2024-11-06 ENCOUNTER — LAB (OUTPATIENT)
Dept: LAB | Facility: LAB | Age: 59
End: 2024-11-06
Payer: COMMERCIAL

## 2024-11-06 DIAGNOSIS — I10 PRIMARY HYPERTENSION: ICD-10-CM

## 2024-11-06 DIAGNOSIS — Z12.5 PROSTATE CANCER SCREENING: ICD-10-CM

## 2024-11-06 DIAGNOSIS — E78.5 HYPERLIPIDEMIA, UNSPECIFIED HYPERLIPIDEMIA TYPE: ICD-10-CM

## 2024-11-06 LAB
ANION GAP SERPL CALC-SCNC: 14 MMOL/L (ref 10–20)
APPEARANCE UR: CLEAR
BILIRUB UR STRIP.AUTO-MCNC: NEGATIVE MG/DL
BUN SERPL-MCNC: 16 MG/DL (ref 6–23)
CALCIUM SERPL-MCNC: 9.4 MG/DL (ref 8.6–10.6)
CHLORIDE SERPL-SCNC: 102 MMOL/L (ref 98–107)
CHOLEST SERPL-MCNC: 209 MG/DL (ref 0–199)
CHOLESTEROL/HDL RATIO: 3
CO2 SERPL-SCNC: 25 MMOL/L (ref 21–32)
COLOR UR: NORMAL
CREAT SERPL-MCNC: 1.02 MG/DL (ref 0.5–1.3)
EGFRCR SERPLBLD CKD-EPI 2021: 85 ML/MIN/1.73M*2
ERYTHROCYTE [DISTWIDTH] IN BLOOD BY AUTOMATED COUNT: 13.9 % (ref 11.5–14.5)
GLUCOSE SERPL-MCNC: 91 MG/DL (ref 74–99)
GLUCOSE UR STRIP.AUTO-MCNC: NORMAL MG/DL
HCT VFR BLD AUTO: 46.8 % (ref 41–52)
HDLC SERPL-MCNC: 70.5 MG/DL
HGB BLD-MCNC: 15.1 G/DL (ref 13.5–17.5)
HOLD SPECIMEN: NORMAL
KETONES UR STRIP.AUTO-MCNC: NEGATIVE MG/DL
LDLC SERPL CALC-MCNC: 123 MG/DL
LEUKOCYTE ESTERASE UR QL STRIP.AUTO: NEGATIVE
MCH RBC QN AUTO: 29.9 PG (ref 26–34)
MCHC RBC AUTO-ENTMCNC: 32.3 G/DL (ref 32–36)
MCV RBC AUTO: 93 FL (ref 80–100)
NITRITE UR QL STRIP.AUTO: NEGATIVE
NON HDL CHOLESTEROL: 139 MG/DL (ref 0–149)
NRBC BLD-RTO: 0 /100 WBCS (ref 0–0)
PH UR STRIP.AUTO: 5.5 [PH]
PLATELET # BLD AUTO: 230 X10*3/UL (ref 150–450)
POTASSIUM SERPL-SCNC: 4.2 MMOL/L (ref 3.5–5.3)
PROT UR STRIP.AUTO-MCNC: NEGATIVE MG/DL
PSA SERPL-MCNC: 4.03 NG/ML
RBC # BLD AUTO: 5.05 X10*6/UL (ref 4.5–5.9)
RBC # UR STRIP.AUTO: NEGATIVE /UL
SODIUM SERPL-SCNC: 137 MMOL/L (ref 136–145)
SP GR UR STRIP.AUTO: 1.01
TRIGL SERPL-MCNC: 78 MG/DL (ref 0–149)
UROBILINOGEN UR STRIP.AUTO-MCNC: NORMAL MG/DL
VLDL: 16 MG/DL (ref 0–40)
WBC # BLD AUTO: 7.6 X10*3/UL (ref 4.4–11.3)

## 2024-11-06 PROCEDURE — 81003 URINALYSIS AUTO W/O SCOPE: CPT

## 2024-11-06 PROCEDURE — 85027 COMPLETE CBC AUTOMATED: CPT

## 2024-11-06 PROCEDURE — 36415 COLL VENOUS BLD VENIPUNCTURE: CPT

## 2024-11-06 PROCEDURE — 80061 LIPID PANEL: CPT

## 2024-11-06 PROCEDURE — G0103 PSA SCREENING: HCPCS

## 2024-11-06 PROCEDURE — 80048 BASIC METABOLIC PNL TOTAL CA: CPT

## 2024-11-07 DIAGNOSIS — R97.20 ELEVATED PSA: Primary | ICD-10-CM

## 2024-11-07 DIAGNOSIS — E78.5 HYPERLIPIDEMIA, UNSPECIFIED HYPERLIPIDEMIA TYPE: ICD-10-CM

## 2024-11-07 RX ORDER — ATORVASTATIN CALCIUM 20 MG/1
20 TABLET, FILM COATED ORAL DAILY
Qty: 90 TABLET | Refills: 1 | Status: SHIPPED | OUTPATIENT
Start: 2024-11-07

## 2024-11-09 ENCOUNTER — HOSPITAL ENCOUNTER (OUTPATIENT)
Dept: RADIOLOGY | Facility: HOSPITAL | Age: 59
Discharge: HOME | End: 2024-11-09
Payer: COMMERCIAL

## 2024-11-09 DIAGNOSIS — K76.0 FATTY LIVER: ICD-10-CM

## 2024-11-09 PROCEDURE — 76705 ECHO EXAM OF ABDOMEN: CPT

## 2024-11-09 PROCEDURE — 76705 ECHO EXAM OF ABDOMEN: CPT | Performed by: RADIOLOGY

## 2024-11-11 PROBLEM — K76.0 FATTY LIVER: Status: RESOLVED | Noted: 2023-07-04 | Resolved: 2024-11-11

## 2024-11-21 ENCOUNTER — HOSPITAL ENCOUNTER (OUTPATIENT)
Dept: RADIOLOGY | Facility: CLINIC | Age: 59
End: 2024-11-21
Payer: COMMERCIAL

## 2024-11-22 ENCOUNTER — HOSPITAL ENCOUNTER (OUTPATIENT)
Dept: RADIOLOGY | Facility: CLINIC | Age: 59
Discharge: HOME | End: 2024-11-22
Payer: COMMERCIAL

## 2024-11-22 DIAGNOSIS — F17.219 CIGARETTE NICOTINE DEPENDENCE WITH NICOTINE-INDUCED DISORDER: ICD-10-CM

## 2024-11-22 PROCEDURE — 71271 CT THORAX LUNG CANCER SCR C-: CPT

## 2024-11-26 DIAGNOSIS — E78.5 HYPERLIPIDEMIA, UNSPECIFIED HYPERLIPIDEMIA TYPE: Primary | ICD-10-CM

## 2024-11-26 DIAGNOSIS — I10 PRIMARY HYPERTENSION: ICD-10-CM

## 2024-11-26 DIAGNOSIS — I25.10 CORONARY ARTERY CALCIFICATION: ICD-10-CM

## 2024-12-09 NOTE — PROGRESS NOTES
Subjective   Patient ID: Jorge Cheng is a 59 y.o. male    \[facility] [place] [facility]\""  59 y.o. male who presents to [facility] for elevated PSA. He was referred by his PCP Dr. Edmonds. His most recent PSA conducted on 11/06/2024 ws 4.03 ng/mL. Prior PSA levels were 2.55 ng/mL (07/08/2023), 2.11 ng/mL (07/16/2022), and 3.20 ng/mL (06/19/2020). ***      Review of Systems    All systems were reviewed. Anything negative was noted in the HPI.    Objective   Physical Exam    General: Well developed, well nourished, alert and cooperative, appears in no acute distress   Eyes: Non-injected conjunctiva, sclera clear, no proptosis   Cardiac: Extremities are warm and well perfused. No edema, cyanosis or pallor   Lungs: Breathing is easy, non-labored. Speaking in clear and complete sentences. Normal diaphragmatic movement   MSK: Ambulatory with steady gait, unassisted   Neuro: Alert and oriented to person, place, and time   Psych: Demonstrates good judgment and reason, without hallucinations, abnormal affect or abnormal behaviors   Skin: No obvious lesions, no rashes       No CVA tenderness bilaterally   No suprapubic pain or discomfort       Past Medical History:   Diagnosis Date    Abnormal findings on diagnostic imaging of other specified body structures 07/02/2020    Abnormal CXR    Abnormal levels of other serum enzymes 07/02/2020    Elevated lipase    Encounter for follow-up examination after completed treatment for conditions other than malignant neoplasm 03/07/2022    Hospital discharge follow-up    Other disorders of electrolyte and fluid balance, not elsewhere classified 07/02/2020    Electrolyte abnormality    Other microscopic hematuria 07/05/2020    Microhematuria    Other specified abnormal findings of blood chemistry 07/02/2020    Elevated platelet count    Other specified abnormal findings of blood chemistry 07/08/2022    Elevated LFTs    Personal history of diseases of the blood and blood-forming organs and certain disorders  involving the immune mechanism 07/02/2020    History of anemia    Personal history of other diseases of the circulatory system 07/02/2020    History of hypertension    Personal history of other specified conditions 03/07/2022    History of tachycardia         Past Surgical History:   Procedure Laterality Date    OTHER SURGICAL HISTORY  06/23/2020    Appendectomy         Assessment/Plan   Elevated PSA    59 y.o. male who presents for the above condition,  We had a very long and extensive discussion with the patient regarding elevated PSA. I explained to him the pathophysiology, differential diagnosis, risk factor, associated conditions, and management. I explained to him that elevated PSA could be either due to BPH, prostate infection or inflammation or prostate adenocarcinoma. We discussed the need to do a work-up to rule out any underlying prostate adenocarcinoma in the form of MR fusion biopsy if his MRI is positive or regular TRUS biopsy of the MRI is negative or we cannot do an MRI of the prostate. We discussed at length the risk, benefit, potential complication, adverse events of prostate biopsy including pain, discomfort, urinary retention, hematuria, pneumaturia, hematospermia. Explained to him that there is a 2% to 5% risk of complicated urinary infection and urosepsis. Explained to him indicates it happens, he will need to be admitted for IV antibiotic for 2 to 3 days at the hospital.      Plan:  - ***    E&M visit today is associated with current or anticipated ongoing medical care services related to a patient's single, serious condition or a complex condition.     12/10/2024    Scribe Attestation  By signing my name below, I, ***, Scribe attest that this documentation has been prepared under the direction and in the presence of Dr. Yazan Orellana.

## 2024-12-10 ENCOUNTER — OFFICE VISIT (OUTPATIENT)
Dept: UROLOGY | Facility: HOSPITAL | Age: 59
End: 2024-12-10
Payer: COMMERCIAL

## 2024-12-10 DIAGNOSIS — R97.20 ELEVATED PSA: Primary | ICD-10-CM

## 2024-12-10 PROCEDURE — 99214 OFFICE O/P EST MOD 30 MIN: CPT | Performed by: STUDENT IN AN ORGANIZED HEALTH CARE EDUCATION/TRAINING PROGRAM

## 2024-12-10 PROCEDURE — G2211 COMPLEX E/M VISIT ADD ON: HCPCS | Performed by: STUDENT IN AN ORGANIZED HEALTH CARE EDUCATION/TRAINING PROGRAM

## 2024-12-10 PROCEDURE — 99204 OFFICE O/P NEW MOD 45 MIN: CPT | Performed by: STUDENT IN AN ORGANIZED HEALTH CARE EDUCATION/TRAINING PROGRAM

## 2024-12-10 NOTE — PROGRESS NOTES
Subjective   Patient ID: Jorge Cheng is a 59 y.o. male    [facility]  59 y.o. male who presents to [facility] for elevated PSA. He was referred by his PCP Dr. Edmonds. His most recent PSA conducted on 11/06/2024 ws 4.03 ng/mL. Prior PSA levels were 2.55 ng/mL (07/08/2023), 2.11 ng/mL (07/16/2022), and 3.20 ng/mL (06/19/2020).     The patient has had nocturia. He cut down caffeine and stopped drinking after 8 PM, and since has improved. He reports occasional discomfort in the testicles. He denies any dysuria or hematuria.       Review of Systems    All systems were reviewed. Anything negative was noted in the HPI.    Objective   Physical Exam    General: Well developed, well nourished, alert and cooperative, appears in no acute distress   Eyes: Non-injected conjunctiva, sclera clear, no proptosis   Cardiac: Extremities are warm and well perfused. No edema, cyanosis or pallor   Lungs: Breathing is easy, non-labored. Speaking in clear and complete sentences. Normal diaphragmatic movement   MSK: Ambulatory with steady gait, unassisted   Neuro: Alert and oriented to person, place, and time   Psych: Demonstrates good judgment and reason, without hallucinations, abnormal affect or abnormal behaviors   Skin: No obvious lesions, no rashes       No CVA tenderness bilaterally   No suprapubic pain or discomfort       Past Medical History:   Diagnosis Date    Abnormal findings on diagnostic imaging of other specified body structures 07/02/2020    Abnormal CXR    Abnormal levels of other serum enzymes 07/02/2020    Elevated lipase    Encounter for follow-up examination after completed treatment for conditions other than malignant neoplasm 03/07/2022    Hospital discharge follow-up    Other disorders of electrolyte and fluid balance, not elsewhere classified 07/02/2020    Electrolyte abnormality    Other microscopic hematuria 07/05/2020    Microhematuria    Other specified abnormal findings of blood chemistry 07/02/2020    Elevated  platelet count    Other specified abnormal findings of blood chemistry 07/08/2022    Elevated LFTs    Personal history of diseases of the blood and blood-forming organs and certain disorders involving the immune mechanism 07/02/2020    History of anemia    Personal history of other diseases of the circulatory system 07/02/2020    History of hypertension    Personal history of other specified conditions 03/07/2022    History of tachycardia         Past Surgical History:   Procedure Laterality Date    OTHER SURGICAL HISTORY  06/23/2020    Appendectomy         Assessment/Plan   Elevated PSA    59 y.o. male who presents for the above condition,  We had a very long and extensive discussion with the patient regarding elevated PSA. I explained to him the pathophysiology, differential diagnosis, risk factor, associated conditions, and management. I explained to him that elevated PSA could be either due to BPH, prostate infection or inflammation or prostate adenocarcinoma. We discussed the need to do a work-up to rule out any underlying prostate adenocarcinoma in the form of MR fusion biopsy if his MRI is positive or regular TRUS biopsy of the MRI is negative or we cannot do an MRI of the prostate. We discussed at length the risk, benefit, potential complication, adverse events of prostate biopsy including pain, discomfort, urinary retention, hematuria, pneumaturia, hematospermia. Explained to him that there is a 2% to 5% risk of complicated urinary infection and urosepsis. Explained to him indicates it happens, he will need to be admitted for IV antibiotic for 2 to 3 days at the hospital.      Plan:  - Will schedule for Prostate MRI. If MRI is negative, we will monitor 6 months and repeat in 6 months. If positive, we will go ahead and get a biopsy done.   - Follow up with the MRI result.    E&M visit today is associated with current or anticipated ongoing medical care services related to a patient's single, serious  condition or a complex condition.     12/10/2024    Scribe Attestation  By signing my name below, I Lelia Mcclellan, Scribrajni attest that this documentation has been prepared under the direction and in the presence of Dr. Yazan Orellana.

## 2024-12-16 LAB — NONINV COLON CA DNA+OCC BLD SCRN STL QL: NEGATIVE

## 2024-12-27 ENCOUNTER — APPOINTMENT (OUTPATIENT)
Dept: RADIOLOGY | Facility: HOSPITAL | Age: 59
End: 2024-12-27
Payer: COMMERCIAL

## 2025-01-02 DIAGNOSIS — I82.402 DEEP VEIN THROMBOSIS (DVT) OF LEFT LOWER EXTREMITY, UNSPECIFIED CHRONICITY, UNSPECIFIED VEIN (MULTI): ICD-10-CM

## 2025-01-02 RX ORDER — APIXABAN 5 MG/1
5 TABLET, FILM COATED ORAL 2 TIMES DAILY
Qty: 180 TABLET | Refills: 3 | Status: SHIPPED | OUTPATIENT
Start: 2025-01-02

## 2025-01-10 ENCOUNTER — HOSPITAL ENCOUNTER (OUTPATIENT)
Dept: RADIOLOGY | Facility: HOSPITAL | Age: 60
Discharge: HOME | End: 2025-01-10
Payer: COMMERCIAL

## 2025-01-10 DIAGNOSIS — R97.20 ELEVATED PSA: ICD-10-CM

## 2025-01-10 PROCEDURE — A9575 INJ GADOTERATE MEGLUMI 0.1ML: HCPCS | Performed by: STUDENT IN AN ORGANIZED HEALTH CARE EDUCATION/TRAINING PROGRAM

## 2025-01-10 PROCEDURE — 2550000001 HC RX 255 CONTRASTS: Performed by: STUDENT IN AN ORGANIZED HEALTH CARE EDUCATION/TRAINING PROGRAM

## 2025-01-10 PROCEDURE — 72197 MRI PELVIS W/O & W/DYE: CPT

## 2025-01-10 RX ORDER — GADOTERATE MEGLUMINE 376.9 MG/ML
12 INJECTION INTRAVENOUS
Status: COMPLETED | OUTPATIENT
Start: 2025-01-10 | End: 2025-01-10

## 2025-01-10 RX ADMIN — GADOTERATE MEGLUMINE 12 ML: 376.9 INJECTION INTRAVENOUS at 16:55

## 2025-01-16 NOTE — PROGRESS NOTES
Subjective   Patient ID: Jorge Cheng is a 59 y.o. male    [facility]  59 y.o. male who presents to [facility] for a follow-up visit with MR prostate results regarding his  elevated PSA. He was referred by his PCP Dr. Edmonds. His most recent PSA conducted on 11/06/2024 ws 4.03 ng/mL. Prior PSA levels were 2.55 ng/mL (07/08/2023), 2.11 ng/mL (07/16/2022), and 3.20 ng/mL (06/19/2020). PI-RADS 4 lesion on MR prostate conducted 01/10/2025.    The patient is currently taking Eliquis with a history of blood clots.    The most recent MR prostate, conducted on 01/10/2025, revealed:  1.  Bilateral patchy and nodular areas in the posterolateral  peripheral zone meeting criteria for PI-RADS 4, measuring up to 1.4  cm on the right and 1.0 cm on the left. No evidence of extracapsular  disease.      PI-RADS 4 - High (clinically significant cancer is likely to be  present).      Review of Systems    All systems were reviewed. Anything negative was noted in the HPI.    Objective   Physical Exam    General: Well developed, well nourished, alert and cooperative, appears in no acute distress   Eyes: Non-injected conjunctiva, sclera clear, no proptosis   Cardiac: Extremities are warm and well perfused. No edema, cyanosis or pallor   Lungs: Breathing is easy, non-labored. Speaking in clear and complete sentences. Normal diaphragmatic movement   MSK: Ambulatory with steady gait, unassisted   Neuro: Alert and oriented to person, place, and time   Psych: Demonstrates good judgment and reason, without hallucinations, abnormal affect or abnormal behaviors   Skin: No obvious lesions, no rashes       No CVA tenderness bilaterally   No suprapubic pain or discomfort       Past Medical History:   Diagnosis Date    Abnormal findings on diagnostic imaging of other specified body structures 07/02/2020    Abnormal CXR    Abnormal levels of other serum enzymes 07/02/2020    Elevated lipase    Encounter for follow-up examination after completed treatment  for conditions other than malignant neoplasm 03/07/2022    Hospital discharge follow-up    Other disorders of electrolyte and fluid balance, not elsewhere classified 07/02/2020    Electrolyte abnormality    Other microscopic hematuria 07/05/2020    Microhematuria    Other specified abnormal findings of blood chemistry 07/02/2020    Elevated platelet count    Other specified abnormal findings of blood chemistry 07/08/2022    Elevated LFTs    Personal history of diseases of the blood and blood-forming organs and certain disorders involving the immune mechanism 07/02/2020    History of anemia    Personal history of other diseases of the circulatory system 07/02/2020    History of hypertension    Personal history of other specified conditions 03/07/2022    History of tachycardia         Past Surgical History:   Procedure Laterality Date    OTHER SURGICAL HISTORY  06/23/2020    Appendectomy         Assessment/Plan   Elevated PSA, PI-RADS 4 lesion on MR    59 y.o. male who presents for the above condition,  We had a very long and extensive discussion with the patient regarding elevated PSA. I explained to him the pathophysiology, differential diagnosis, risk factor, associated conditions, and management. I explained to him that elevated PSA could be either due to BPH, prostate infection or inflammation or prostate adenocarcinoma. We discussed the need to do a work-up to rule out any underlying prostate adenocarcinoma in the form of MR fusion biopsy if his MRI is positive or regular TRUS biopsy of the MRI is negative or we cannot do an MRI of the prostate. We discussed at length the risk, benefit, potential complication, adverse events of prostate biopsy including pain, discomfort, urinary retention, hematuria, pneumaturia, hematospermia. Explained to him that there is a 2% to 5% risk of complicated urinary infection and urosepsis. Explained to him indicates it happens, he will need to be admitted for IV antibiotic for 2  to 3 days at the hospital.      - Provided pt with biopsy intervention and observation options.  - Pt elected to move forward with biopsy at this time.    - Pt reports he is currently taking Eliquis  - Pt will obtain PCP and oncology clearance to stop Eliquis 2 days before, the day of, and 2 days after biopsy.    Plan:  - Pt will call to schedule MR fusion biopsy after obtaining PCP and Oncology clearance    E&M visit today is associated with current or anticipated ongoing medical care services related to a patient's single, serious condition or a complex condition.     1/21/2025    Scribe Attestation  By signing my name below, IDelma Scribe attest that this documentation has been prepared under the direction and in the presence of Dr. Yazan Orellana.

## 2025-01-21 ENCOUNTER — OFFICE VISIT (OUTPATIENT)
Dept: UROLOGY | Facility: HOSPITAL | Age: 60
End: 2025-01-21
Payer: COMMERCIAL

## 2025-01-21 DIAGNOSIS — Z79.2 PROPHYLACTIC ANTIBIOTIC: Primary | ICD-10-CM

## 2025-01-21 PROCEDURE — G2211 COMPLEX E/M VISIT ADD ON: HCPCS | Performed by: STUDENT IN AN ORGANIZED HEALTH CARE EDUCATION/TRAINING PROGRAM

## 2025-01-21 PROCEDURE — 1036F TOBACCO NON-USER: CPT | Performed by: STUDENT IN AN ORGANIZED HEALTH CARE EDUCATION/TRAINING PROGRAM

## 2025-01-21 PROCEDURE — 99214 OFFICE O/P EST MOD 30 MIN: CPT | Performed by: STUDENT IN AN ORGANIZED HEALTH CARE EDUCATION/TRAINING PROGRAM

## 2025-01-21 RX ORDER — CIPROFLOXACIN 500 MG/1
500 TABLET ORAL 2 TIMES DAILY
Qty: 6 TABLET | Refills: 0 | Status: SHIPPED | OUTPATIENT
Start: 2025-01-21 | End: 2025-01-24

## 2025-01-21 ASSESSMENT — PATIENT HEALTH QUESTIONNAIRE - PHQ9
SUM OF ALL RESPONSES TO PHQ9 QUESTIONS 1 AND 2: 0
1. LITTLE INTEREST OR PLEASURE IN DOING THINGS: NOT AT ALL
2. FEELING DOWN, DEPRESSED OR HOPELESS: NOT AT ALL

## 2025-01-24 ENCOUNTER — PREP FOR PROCEDURE (OUTPATIENT)
Dept: UROLOGY | Facility: HOSPITAL | Age: 60
End: 2025-01-24
Payer: COMMERCIAL

## 2025-01-24 DIAGNOSIS — R97.20 PSA ELEVATION: Primary | ICD-10-CM

## 2025-01-29 ENCOUNTER — TELEPHONE (OUTPATIENT)
Dept: HEMATOLOGY/ONCOLOGY | Facility: CLINIC | Age: 60
End: 2025-01-29
Payer: COMMERCIAL

## 2025-01-29 NOTE — TELEPHONE ENCOUNTER
Clearance was sent to surgeons office for upcoming surgery. Surgeon asking we order the Lovenox bridge required for patient. Message given to Dr. Canela to order to patient's [facility]. Patient will check with [facility] if they offer teaching for injection, if not he will call me and we will get him in here to teach injection.

## 2025-01-31 ENCOUNTER — OFFICE VISIT (OUTPATIENT)
Dept: PRIMARY CARE | Facility: CLINIC | Age: 60
End: 2025-01-31
Payer: COMMERCIAL

## 2025-01-31 VITALS
HEART RATE: 68 BPM | WEIGHT: 129.4 LBS | OXYGEN SATURATION: 98 % | SYSTOLIC BLOOD PRESSURE: 113 MMHG | HEIGHT: 64 IN | BODY MASS INDEX: 22.09 KG/M2 | RESPIRATION RATE: 14 BRPM | DIASTOLIC BLOOD PRESSURE: 73 MMHG

## 2025-01-31 DIAGNOSIS — I26.99 PULMONARY EMBOLISM WITHOUT ACUTE COR PULMONALE, UNSPECIFIED CHRONICITY, UNSPECIFIED PULMONARY EMBOLISM TYPE (MULTI): Primary | ICD-10-CM

## 2025-01-31 DIAGNOSIS — Z01.810 PREOP CARDIOVASCULAR EXAM: Primary | ICD-10-CM

## 2025-01-31 PROCEDURE — 93000 ELECTROCARDIOGRAM COMPLETE: CPT | Performed by: FAMILY MEDICINE

## 2025-01-31 PROCEDURE — 3078F DIAST BP <80 MM HG: CPT | Performed by: FAMILY MEDICINE

## 2025-01-31 PROCEDURE — 99214 OFFICE O/P EST MOD 30 MIN: CPT | Performed by: FAMILY MEDICINE

## 2025-01-31 PROCEDURE — 3008F BODY MASS INDEX DOCD: CPT | Performed by: FAMILY MEDICINE

## 2025-01-31 PROCEDURE — 3074F SYST BP LT 130 MM HG: CPT | Performed by: FAMILY MEDICINE

## 2025-01-31 RX ORDER — ENOXAPARIN SODIUM 100 MG/ML
INJECTION SUBCUTANEOUS
Qty: 10 EACH | Refills: 1 | Status: SHIPPED | OUTPATIENT
Start: 2025-01-31

## 2025-01-31 NOTE — PATIENT INSTRUCTIONS
Nonfasting labs.    F/U 4 months as previously advised: Med refills.     Lab services: [address]  Hours: M-F 7:15a-6:00p  Phone: [phone], Option 1

## 2025-01-31 NOTE — PROGRESS NOTES
"Subjective   Patient ID: Jorge Cheng is a 59 y.o. male who presents for SURGICAL CLEARANCE .    HPI   Patient is here for preop cardiac risk assessment.  No acute complaints at this time.  Surgical procedure: MR fusion prostate biopsy.    Detsky's Modified Cardiac Risk Index:   Age > 70: No  MI within 6 months: No  MI after 6 months: No  Class III angina: No  Class IV angina: No  Unstable angina within 6 months: No  Pulmonary edema within 1 week: No  Pulmonary edema ever: No  Suspected critical aortic stenosis: No  EKG with rhythm other than sinus or sinus No  EKG with 5 PVCs: No  Emergency operation: No  Poor general medical status (Fernandez Multifactorial Index): {MEGYN:80869}    ACC/AHA Preoperative Cardiac Risk Index:  Major: Unstable angina Yes, recent MI No, decompensated CHF   No significant arrhythmias No, severe valvular disease No.  Intermediate: Mild angina No, prior MI No, compensated or prior CHF   No, DM No, renal insufficiency {MEGYN:73997}.  Minor: Advanced age No, abnormal EKG No, rhythm other than sinus   No, poor functional capacity No, history of stroke No, uncontrolled HTN No.  Exercise capacity: 4 mets.    I personally reviewed EKG at time of office visit (Sinus rhythm, no acute ischemic changes).   I personally reviewed nonfasting lab results (BMP, AST, ALT) after visit.    Review of Systems  No other complaints.     Objective   /73   Pulse 68   Resp 14   Ht 1.613 m (5' 3.5\")   Wt 58.7 kg (129 lb 6.4 oz)   SpO2 98%   BMI 22.56 kg/m²     Physical Exam  Constitutional:       General: He is not in acute distress.     Appearance: He is normal weight.   HENT:      Head: Normocephalic.      Right Ear: Tympanic membrane normal.      Left Ear: Tympanic membrane normal.      Mouth/Throat:      Pharynx: Oropharynx is clear. No oropharyngeal exudate or posterior oropharyngeal erythema.   Eyes:      Extraocular Movements: Extraocular movements intact.      Conjunctiva/sclera: Conjunctivae " normal.      Pupils: Pupils are equal, round, and reactive to light.   Neck:      Thyroid: No thyromegaly.      Vascular: No carotid bruit.   Cardiovascular:      Rate and Rhythm: Normal rate and regular rhythm.      Heart sounds: Normal heart sounds. No murmur heard.     No friction rub. No gallop.   Pulmonary:      Effort: Pulmonary effort is normal.      Breath sounds: Normal breath sounds. No wheezing, rhonchi or rales.   Abdominal:      General: Bowel sounds are normal. There is no distension.      Palpations: Abdomen is soft. There is no mass.      Tenderness: There is no abdominal tenderness. There is no guarding or rebound.   Lymphadenopathy:      Cervical: No cervical adenopathy.   Skin:     Coloration: Skin is not jaundiced or pale.   Neurological:      General: No focal deficit present.      Mental Status: He is oriented to person, place, and time.   Psychiatric:         Mood and Affect: Mood normal.         Behavior: Behavior normal.     Assessment/Plan   Diagnoses and all orders for this visit:  Preop cardiovascular exam  -     ECG 12 lead (Clinic Performed)  -     Basic Metabolic Panel; Future  -     Alanine Aminotransferase; Future  -     Aspartate Aminotransferase; Future    Per Detsky's Modified Cardiac Risk Index:   Patient is at {MEGLOW:37130} cardiac risk.    Per ACC/AHA Preoperative Cardiac Risk Index:  Noninvasive cardiac testing {is/is not:94426} not recommended.    F/U 4 months as previously advised: Med refills.

## 2025-02-07 LAB
ALT SERPL-CCNC: 17 U/L (ref 9–46)
ANION GAP SERPL CALCULATED.4IONS-SCNC: 16 MMOL/L (CALC) (ref 7–17)
AST SERPL-CCNC: 20 U/L (ref 10–35)
BUN SERPL-MCNC: 12 MG/DL (ref 7–25)
BUN/CREAT SERPL: NORMAL (CALC) (ref 6–22)
CALCIUM SERPL-MCNC: 9.6 MG/DL (ref 8.6–10.3)
CHLORIDE SERPL-SCNC: 103 MMOL/L (ref 98–110)
CO2 SERPL-SCNC: 20 MMOL/L (ref 20–32)
CREAT SERPL-MCNC: 1.02 MG/DL (ref 0.7–1.3)
EGFRCR SERPLBLD CKD-EPI 2021: 85 ML/MIN/1.73M2
GLUCOSE SERPL-MCNC: 77 MG/DL (ref 65–99)
POTASSIUM SERPL-SCNC: 3.7 MMOL/L (ref 3.5–5.3)
SODIUM SERPL-SCNC: 139 MMOL/L (ref 135–146)

## 2025-02-28 ENCOUNTER — CLINICAL SUPPORT (OUTPATIENT)
Dept: PREADMISSION TESTING | Facility: HOSPITAL | Age: 60
End: 2025-02-28
Payer: COMMERCIAL

## 2025-02-28 DIAGNOSIS — R97.20 PSA ELEVATION: ICD-10-CM

## 2025-02-28 NOTE — CPM/PAT NURSE NOTE
CPM/PAT Nurse Note      Name: Jorge Cheng (Jorge Mahoneyorell)  /Age: 1965/60 y.o.       Past Medical History:   Diagnosis Date    DVT (deep venous thrombosis) (Multi)     left leg, Bilateral PE remote.    Factor V Leiden (Multi)     and Prothrombin gene mutations    H/O ETOH abuse     sober since 2022    Hyperlipidemia     Hypertension     Other specified abnormal findings of blood chemistry 2020    Elevated platelet count 2024 230    Other specified abnormal findings of blood chemistry 2022    Elevated LFTs, 2024 AST 20, ALT 18, PHOS 61    Personal history of diseases of the blood and blood-forming organs and certain disorders involving the immune mechanism 2020    History of anemia, 15.1/46.8 H&H 2024    Personal history of other specified conditions 2022    History of tachycardia    Vision loss     left eye, Glaucoma       Past Surgical History:   Procedure Laterality Date    OTHER SURGICAL HISTORY  2020    Appendectomy       Patient  reports never being sexually active.    Family History   Problem Relation Name Age of Onset    No Known Problems Mother      Dementia Father      Breast cancer Sister         No Known Allergies    Prior to Admission medications    Medication Sig Start Date End Date Taking? Authorizing Provider   atorvastatin (Lipitor) 20 mg tablet Take 1 tablet (20 mg) by mouth once daily. 24   Ubaldo Edmonds MD   Eliquis 5 mg tablet Take 1 tablet (5 mg) by mouth 2 times a day. 25   Demetrio Canela MD   enoxaparin (Lovenox) 60 mg/0.6 mL syringe Every 12 hrs for 5 days prior to surgery 25   Demetrio Canela MD   hydrOXYzine pamoate (VistariL) 50 mg capsule Take 1 capsule (50 mg) by mouth 3 times a day as needed (anxiety, insomnia).  Patient not taking: Reported on 2025 3/13/24   Ubaldo Edmonds MD   latanoprost (Xalatan) 0.005 % ophthalmic solution Administer 1 drop into both eyes once daily at bedtime.    Historical  Provider, MD   lisinopril 5 mg tablet Take 1 tablet (5 mg) by mouth once daily. 11/4/24   Ubaldo Edmonds MD   multivitamin with minerals (multivit-min-iron fum-folic ac) tablet Take 1 tablet by mouth once daily. 6/18/20   Historical Provider, MD RON CORONADO     DASI Risk Score      Flowsheet Row Questionnaire Series Submission from 1/24/2025 in [facility] with Generic Provider [place]   Can you take care of yourself (eat, dress, bathe, or use toilet)?  2.75  filed at 01/24/2025 0903   Can you walk indoors, such as around your house? 1.75  filed at 01/24/2025 0903   Can you walk a block or two on level ground?  2.75  filed at 01/24/2025 0903   Can you climb a flight of stairs or walk up a hill? 5.5  filed at 01/24/2025 0903   Can you run a short distance? 8  filed at 01/24/2025 0903   Can you do light work around the house like dusting or washing dishes? 2.7  filed at 01/24/2025 0903   Can you do moderate work around the house like vacuuming, sweeping floors or carrying groceries? 3.5  filed at 01/24/2025 0903   Can you do heavy work around the house like scrubbing floors or lifting and moving heavy furniture?  8  filed at 01/24/2025 0903   Can you do yard work like raking leaves, weeding or pushing a mower? 4.5  filed at 01/24/2025 0903   Can you have sexual relations? 5.25  filed at 01/24/2025 0903   Can you participate in moderate recreational activities like golf, bowling, dancing, doubles tennis or throwing a baseball or football? 6  filed at 01/24/2025 0903   Can you participate in strenous sports like swimming, singles tennis, football, basketball, or skiing? 7.5  filed at 01/24/2025 0903   DASI SCORE 58.2  filed at 01/24/2025 0903   METS Score (Will be calculated only when all the questions are answered) 9.9  filed at 01/24/2025 0903          Caprini DVT Assessment    No data to display       Modified Frailty Index    No data to display       [id number] Stroke Risk Points  Current as of just now         N/A 0 to 9 Points:      Last Change: N/A          The TPD0TR9-LVPu risk score (Lip JULISSA, et al. 2009. © 2010 American College [employer]) quantifies the risk of stroke for a patient with atrial fibrillation. For patients without atrial fibrillation or under the age of 18 this score appears as N/A. Higher score values generally indicate higher risk of stroke.        This score is not applicable to this patient. Components are not calculated.          Revised Cardiac Risk Index    No data to display       Apfel Simplified Score    No data to display       Risk Analysis Index Results This Encounter    No data found in the last 10 encounters.       Stop Bang Score      Flowsheet Row Questionnaire Series Submission from 1/24/2025 in [facility] with Generic Provider [place]   Do you snore loudly? 0  filed at 01/24/2025 0903   Do you often feel tired or fatigued after your sleep? 0  filed at 01/24/2025 0903   Has anyone ever observed you stop breathing in your sleep? 0  filed at 01/24/2025 0903   Do you have or are you being treated for high blood pressure? 0  filed at 01/24/2025 0903   Recent BMI (Calculated) 22.8  filed at 01/24/2025 0903   Is BMI greater than 35 kg/m2? 0=No  filed at 01/24/2025 0903   Age older than 50 years old? 1=Yes  filed at 01/24/2025 0903   Gender - Male 1=Yes  filed at 01/24/2025 0903          Prodigy: High Risk  Total Score: 8              Prodigy Gender Score          ARISCAT Score for Postoperative Pulmonary Complications    No data to display       Jordan Perioperative Risk for Myocardial Infarction or Cardiac Arrest (SANTOS)    No data to display         Nurse Plan of Action:   RN screening call complete.  Reviewed allergies, medications and pharmacy, medical, surgical and social history with patient.  Chart updated.

## 2025-03-07 ENCOUNTER — APPOINTMENT (OUTPATIENT)
Dept: LAB | Facility: HOSPITAL | Age: 60
End: 2025-03-07
Payer: COMMERCIAL

## 2025-03-07 ENCOUNTER — PRE-ADMISSION TESTING (OUTPATIENT)
Dept: PREADMISSION TESTING | Facility: HOSPITAL | Age: 60
End: 2025-03-07
Payer: COMMERCIAL

## 2025-03-07 VITALS
OXYGEN SATURATION: 97 % | BODY MASS INDEX: 23.05 KG/M2 | TEMPERATURE: 97 F | DIASTOLIC BLOOD PRESSURE: 79 MMHG | HEIGHT: 63 IN | RESPIRATION RATE: 16 BRPM | HEART RATE: 75 BPM | WEIGHT: 130.07 LBS | SYSTOLIC BLOOD PRESSURE: 122 MMHG

## 2025-03-07 DIAGNOSIS — Z01.818 PREOP TESTING: Primary | ICD-10-CM

## 2025-03-07 DIAGNOSIS — Z01.818 ENCOUNTER FOR OTHER PREPROCEDURAL EXAMINATION: Primary | ICD-10-CM

## 2025-03-07 LAB
BASOPHILS # BLD AUTO: 0.06 X10*3/UL (ref 0–0.1)
BASOPHILS NFR BLD AUTO: 0.8 %
EOSINOPHIL # BLD AUTO: 0.06 X10*3/UL (ref 0–0.7)
EOSINOPHIL NFR BLD AUTO: 0.8 %
ERYTHROCYTE [DISTWIDTH] IN BLOOD BY AUTOMATED COUNT: 13.2 % (ref 11.5–14.5)
HCT VFR BLD AUTO: 44.3 % (ref 41–52)
HGB BLD-MCNC: 15.4 G/DL (ref 13.5–17.5)
IMM GRANULOCYTES # BLD AUTO: 0.01 X10*3/UL (ref 0–0.7)
IMM GRANULOCYTES NFR BLD AUTO: 0.1 % (ref 0–0.9)
LYMPHOCYTES # BLD AUTO: 2.87 X10*3/UL (ref 1.2–4.8)
LYMPHOCYTES NFR BLD AUTO: 37.7 %
MCH RBC QN AUTO: 30.7 PG (ref 26–34)
MCHC RBC AUTO-ENTMCNC: 34.8 G/DL (ref 32–36)
MCV RBC AUTO: 88 FL (ref 80–100)
MONOCYTES # BLD AUTO: 0.59 X10*3/UL (ref 0.1–1)
MONOCYTES NFR BLD AUTO: 7.7 %
NEUTROPHILS # BLD AUTO: 4.03 X10*3/UL (ref 1.2–7.7)
NEUTROPHILS NFR BLD AUTO: 52.9 %
NRBC BLD-RTO: 0 /100 WBCS (ref 0–0)
PLATELET # BLD AUTO: 212 X10*3/UL (ref 150–450)
RBC # BLD AUTO: 5.02 X10*6/UL (ref 4.5–5.9)
WBC # BLD AUTO: 7.6 X10*3/UL (ref 4.4–11.3)

## 2025-03-07 PROCEDURE — 36415 COLL VENOUS BLD VENIPUNCTURE: CPT

## 2025-03-07 PROCEDURE — 99204 OFFICE O/P NEW MOD 45 MIN: CPT | Performed by: PHYSICIAN ASSISTANT

## 2025-03-07 PROCEDURE — 85025 COMPLETE CBC W/AUTO DIFF WBC: CPT

## 2025-03-07 ASSESSMENT — ENCOUNTER SYMPTOMS
CONFUSION: 0
LIMITED RANGE OF MOTION: 0
LIGHT-HEADEDNESS: 0
VOMITING: 0
BRUISES/BLEEDS EASILY: 1
DYSPNEA WITH EXERTION: 0
UNEXPECTED WEIGHT CHANGE: 0
ABDOMINAL PAIN: 0
RHINORRHEA: 0
BLOOD IN STOOL: 0
WEAKNESS: 0
MYALGIAS: 0
DIARRHEA: 0
VISUAL CHANGE: 0
SINUS CONGESTION: 0
NUMBNESS: 0
CHILLS: 0
SHORTNESS OF BREATH: 0
ARTHRALGIAS: 0
COUGH: 0
NECK PAIN: 0
EYE PAIN: 0
TROUBLE SWALLOWING: 0
DYSURIA: 0
FEVER: 0
WHEEZING: 0
DYSPNEA AT REST: 0
SKIN CHANGES: 0
NAUSEA: 0
DOUBLE VISION: 0
PALPITATIONS: 0
ABDOMINAL DISTENTION: 0
EXCESSIVE BLEEDING: 0
HEMOPTYSIS: 0
WOUND: 0
DIFFICULTY URINATING: 0
CONSTIPATION: 0
EYE DISCHARGE: 0
NECK STIFFNESS: 0

## 2025-03-07 NOTE — CPM/PAT NURSE NOTE
CPM/PAT Nurse Note      Name: Jorge Cheng (Jorge Cheng)  /Age: 1965/60 y.o.       Past Medical History:   Diagnosis Date    DVT (deep venous thrombosis) (Multi)     left leg, Bilateral PE remote.    Factor V Leiden (Multi)     and Prothrombin gene mutations    H/O ETOH abuse     sober since 2022    Hyperlipidemia     Hypertension     Other specified abnormal findings of blood chemistry 2020    Elevated platelet count 2024 230    Other specified abnormal findings of blood chemistry 2022    Elevated LFTs, 2024 AST 20, ALT 18, PHOS 61    Personal history of diseases of the blood and blood-forming organs and certain disorders involving the immune mechanism 2020    History of anemia, 15.1/46.8 H&H 2024    Personal history of other specified conditions 2022    History of tachycardia    Vision loss     left eye, Glaucoma       Past Surgical History:   Procedure Laterality Date    OTHER SURGICAL HISTORY  2020    Appendectomy       Patient  reports never being sexually active.    Family History   Problem Relation Name Age of Onset    No Known Problems Mother      Dementia Father      Breast cancer Sister         No Known Allergies    Prior to Admission medications    Medication Sig Start Date End Date Taking? Authorizing Provider   atorvastatin (Lipitor) 20 mg tablet Take 1 tablet (20 mg) by mouth once daily. 24  Yes Ubaldo Edmonds MD   Eliquis 5 mg tablet Take 1 tablet (5 mg) by mouth 2 times a day. 25  Yes Demetrio Canela MD   latanoprost (Xalatan) 0.005 % ophthalmic solution Administer 1 drop into both eyes once daily at bedtime.   Yes Historical Provider, MD   lisinopril 5 mg tablet Take 1 tablet (5 mg) by mouth once daily. 24  Yes Ubaldo Edmonds MD   multivitamin with minerals (multivit-min-iron fum-folic ac) tablet Take 1 tablet by mouth once daily. 20  Yes Historical Provider, MD   enoxaparin (Lovenox) 60 mg/0.6 mL syringe Every  12 hrs for 5 days prior to surgery  Patient not taking: Reported on 3/7/2025 1/31/25   Demetrio Canela MD   hydrOXYzine pamoate (VistariL) 50 mg capsule Take 1 capsule (50 mg) by mouth 3 times a day as needed (anxiety, insomnia).  Patient not taking: Reported on 2/28/2025 3/13/24   Ubaldo Edmonds MD        PAT ROS     DASI Risk Score      Flowsheet Row Questionnaire Series Submission from 1/24/2025 in [facility] with Generic Provider [place]   Can you take care of yourself (eat, dress, bathe, or use toilet)?  2.75  filed at 01/24/2025 0903   Can you walk indoors, such as around your house? 1.75  filed at 01/24/2025 0903   Can you walk a block or two on level ground?  2.75  filed at 01/24/2025 0903   Can you climb a flight of stairs or walk up a hill? 5.5  filed at 01/24/2025 0903   Can you run a short distance? 8  filed at 01/24/2025 0903   Can you do light work around the house like dusting or washing dishes? 2.7  filed at 01/24/2025 0903   Can you do moderate work around the house like vacuuming, sweeping floors or carrying groceries? 3.5  filed at 01/24/2025 0903   Can you do heavy work around the house like scrubbing floors or lifting and moving heavy furniture?  8  filed at 01/24/2025 0903   Can you do yard work like raking leaves, weeding or pushing a mower? 4.5  filed at 01/24/2025 0903   Can you have sexual relations? 5.25  filed at 01/24/2025 0903   Can you participate in moderate recreational activities like golf, bowling, dancing, doubles tennis or throwing a baseball or football? 6  filed at 01/24/2025 0903   Can you participate in strenous sports like swimming, singles tennis, football, basketball, or skiing? 7.5  filed at 01/24/2025 0903   DASI SCORE 58.2  filed at 01/24/2025 0903   METS Score (Will be calculated only when all the questions are answered) 9.9  filed at 01/24/2025 0903          Caprini DVT Assessment    No data to display       Modified Frailty Index    No data to display        [id number] Stroke Risk Points  Current as of just now        N/A 0 to 9 Points:      Last Change: N/A          The [id number] risk score (Lip JULISSA, et al. 2009. © 2010 American College [employer]) quantifies the risk of stroke for a patient with atrial fibrillation. For patients without atrial fibrillation or under the age of 18 this score appears as N/A. Higher score values generally indicate higher risk of stroke.        This score is not applicable to this patient. Components are not calculated.          Revised Cardiac Risk Index    No data to display       Apfel Simplified Score    No data to display       Risk Analysis Index Results This Encounter    No data found in the last 10 encounters.       Stop Bang Score      Flowsheet Row Questionnaire Series Submission from 1/24/2025 in [facility] with Generic Provider [place]   Do you snore loudly? 0  filed at 01/24/2025 0903   Do you often feel tired or fatigued after your sleep? 0  filed at 01/24/2025 0903   Has anyone ever observed you stop breathing in your sleep? 0  filed at 01/24/2025 0903   Do you have or are you being treated for high blood pressure? 0  filed at 01/24/2025 0903   Recent BMI (Calculated) 22.8  filed at 01/24/2025 0903   Is BMI greater than 35 kg/m2? 0=No  filed at 01/24/2025 0903   Age older than 50 years old? 1=Yes  filed at 01/24/2025 0903   Gender - Male 1=Yes  filed at 01/24/2025 0903          Prodigy: High Risk  Total Score: 8              Prodigy Gender Score          ARISCAT Score for Postoperative Pulmonary Complications    No data to display       Jordan Perioperative Risk for Myocardial Infarction or Cardiac Arrest (SANTOS)    No data to display         Nurse Plan of Action:       After Visit Summary (AVS) reviewed and patient verbalized good understanding of medications and NPO instructions.

## 2025-03-07 NOTE — CPM/PAT H&P
[facility] Evaluation       Name: Jorge Cheng (Jorge Cheng)  /Age: 1965/60 y.o.       Date of Consult: 3/7/25    Referring Provider: Dr. Orellana    Surgery, Date, and Length:     Transperineal Prostate Biopsy   3/21/25, 50MIN    Jorge Cheng is a 60 y.o. year-old male who presents to the [facility] for perioperative risk assessment prior to surgery.    Patient presents with a primary diagnosis of elevated PSA. His most recent PSA conducted on 2024 was 4.03 ng/mL. Prior PSA levels were 2.55 ng/mL (2023), 2.11 ng/mL (2022), and 3.20 ng/mL (2020). PI-RADS 4 lesion on MR prostate conducted 01/10/2025.     Of note, pt with h/o FVL and prior DVT/PE in .  Discussed with Dr. Canela today and will proceed with 2 day hold on Eliquis with Lovenox bridge (three total doses) before day of surgery.  Pt fully aware of these instructions.      This note was created in part upon personal review of patient's medical records.      Patient is scheduled to have Transperineal Prostate Biopsy      Pt denies any past history of anesthetic complications such as PONV, awareness, prolonged sedation, dental damage, aspiration, cardiac arrest, difficult intubation, difficult I.V. access or unexpected hospital admissions.  NO malignant hyperthermia and or pseudocholinesterase deficiency.  No history of blood transfusions     The patient is not a Anabaptist and will accept blood and blood products if medically indicated.   Type and screen sent.     Past Medical History:   Diagnosis Date    DVT (deep venous thrombosis) (Multi)     left leg, Bilateral PE remote.    Factor V Leiden (Multi)     and Prothrombin gene mutations    H/O ETOH abuse     sober since 2022    Hyperlipidemia     Hypertension     Other specified abnormal findings of blood chemistry 2020    Elevated platelet count 2024 230    Other specified abnormal findings of blood chemistry 2022    Elevated LFTs,  2024 AST 20, ALT 18, PHOS 61    Personal history of diseases of the blood and blood-forming organs and certain disorders involving the immune mechanism 2020    History of anemia, 15.1/46.8 H&H 2024    Personal history of other specified conditions 2022    History of tachycardia    Vision loss     left eye, Glaucoma       Past Surgical History:   Procedure Laterality Date    OTHER SURGICAL HISTORY  2020    Appendectomy       Patient  reports never being sexually active.    Family History   Problem Relation Name Age of Onset    No Known Problems Mother      Dementia Father      Breast cancer Sister       Social History     Socioeconomic History    Marital status: Single     Spouse name: Not on file    Number of children: Not on file    Years of education: Not on file    Highest education level: Not on file   Occupational History    Not on file   Tobacco Use    Smoking status: Former     Current packs/day: 0.00     Average packs/day: 1 pack/day for 29.0 years (29.0 ttl pk-yrs)     Types: Cigarettes     Start date:      Quit date:      Years since quittin.1    Smokeless tobacco: Never   Substance and Sexual Activity    Alcohol use: Not Currently    Drug use: Not Currently    Sexual activity: Never   Other Topics Concern    Not on file   Social History Narrative    Not on file     Social Drivers of Health     Financial Resource Strain: Not on file   Food Insecurity: Not on file   Transportation Needs: Not on file   Physical Activity: Not on file   Stress: Not on file   Social Connections: Not on file   Intimate Partner Violence: Not on file   Housing Stability: Not on file        No Known Allergies    Current Outpatient Medications   Medication Instructions    atorvastatin (LIPITOR) 20 mg, oral, Daily    Eliquis 5 mg, oral, 2 times daily    enoxaparin (Lovenox) 60 mg/0.6 mL syringe Every 12 hrs for 5 days prior to surgery    hydrOXYzine pamoate (VISTARIL) 50 mg, oral, 3 times daily  PRN    latanoprost (Xalatan) 0.005 % ophthalmic solution 1 drop, Nightly    lisinopril 5 mg, oral, Daily    multivitamin with minerals (multivit-min-iron fum-folic ac) tablet 1 tablet, Daily           PAT ROS:   Constitutional:    no fever   no chills   no unexpected weight change  Neuro/Psych:    no numbness   no weakness   no light-headedness   no confusion  Eyes:    no discharge   no pain   no vision loss   no diplopia   no visual disturbance  Ears:    no ear pain   no hearing loss   no tinnitus  Nose:    no nasal discharge   no sinus congestion   no epistaxis  Mouth:    no dental issues   no mouth pain   no oral bleeding   no mouth lesions  Throat:    no throat pain   no dysphagia  Neck:    no neck pain   no neck stiffness  Cardio:    Functional 4 Mets. Patient denies SOB walking up 2 flights of stairs   Walking, cooking, cleaning, grocery shopping; works in real estate management   no chest pain   no palpitations   no peripheral edema   no dyspnea   no WASHINGTON  Respiratory:    no cough   no wheezing   no hemoptysis   no shortness of breath  Endocrine:    no cold intolerance   no heat intolerance  GI:    no abdominal distention   no abdominal pain   no constipation   no diarrhea   no nausea   no vomiting   no blood in stool  :    no difficulty urinating   no dysuria   no oliguria  Musculoskeletal:    no arthralgias   no myalgias   no decreased ROM  Hematologic:    bruises/bleeds easily (Eliquis)   no excessive bleeding   no history of blood transfusion   blood clots  Skin:   no skin changes   no sores/wound   no rash      Physical Exam  Constitutional:       General: He is not in acute distress.     Appearance: Normal appearance. He is not ill-appearing, toxic-appearing or diaphoretic.   HENT:      Head: Normocephalic and atraumatic.      Nose: Nose normal. No congestion or rhinorrhea.      Mouth/Throat:      Mouth: Mucous membranes are moist.      Pharynx: No posterior oropharyngeal erythema.   Eyes:       "Extraocular Movements: Extraocular movements intact.      Conjunctiva/sclera: Conjunctivae normal.   Cardiovascular:      Rate and Rhythm: Normal rate and regular rhythm.      Pulses: Normal pulses.      Heart sounds: Normal heart sounds. No murmur heard.     No friction rub. No gallop.   Pulmonary:      Effort: Pulmonary effort is normal. No respiratory distress.      Breath sounds: Normal breath sounds. No stridor. No wheezing, rhonchi or rales.   Abdominal:      General: Bowel sounds are normal. There is no distension.      Palpations: Abdomen is soft. There is no mass.      Tenderness: There is no abdominal tenderness. There is no guarding or rebound.      Hernia: No hernia is present.   Musculoskeletal:         General: No swelling, tenderness, deformity or signs of injury. Normal range of motion.      Cervical back: Normal range of motion and neck supple. No rigidity or tenderness.   Skin:     General: Skin is warm and dry.      Coloration: Skin is not jaundiced or pale.      Findings: No bruising, erythema, lesion or rash.   Neurological:      General: No focal deficit present.      Mental Status: He is alert and oriented to person, place, and time.      Cranial Nerves: No cranial nerve deficit.      Sensory: No sensory deficit.      Motor: No weakness.      Coordination: Coordination normal.   Psychiatric:         Mood and Affect: Mood normal.         Behavior: Behavior normal.          PAT AIRWAY:   Airway:     Mallampati::  II    Neck ROM::  Full   Few missing teeth on left; no loose teeth        Visit Vitals  /79   Pulse 75   Temp 36.1 °C (97 °F)   Resp 16   Ht 1.6 m (5' 2.99\")   Wt 59 kg (130 lb 1.1 oz)   SpO2 97%   BMI 23.05 kg/m²   Smoking Status Former   BSA 1.62 m²        LABS:  Lab Results   Component Value Date    GLUCOSE 77 02/06/2025    CALCIUM 9.6 02/06/2025     02/06/2025    K 3.7 02/06/2025    CO2 20 02/06/2025     02/06/2025    BUN 12 02/06/2025    CREATININE 1.02 02/06/2025 "      Lab Results   Component Value Date    ALT 17 02/06/2025    AST 20 02/06/2025    ALKPHOS 61 08/07/2024    BILITOT 0.9 08/07/2024      Lab Results   Component Value Date    WBC 7.6 03/07/2025    HGB 15.4 03/07/2025    HCT 44.3 03/07/2025    MCV 88 03/07/2025     03/07/2025        EKG 1/31/25        Assessment and Plan:     60 y.o.  male  scheduled for Transperineal Prostate Biopsy on 3/21/25 with Dr. Orellana for  elevated PSA.   Presents to [facility] today for perioperative risk stratification and optimization      Cardiovascular:  Patient has no active cardiac symptoms.   Patient denies any chest pain, tightness, heaviness, pressure, radiating pain, palpitations, irregular heartbeats, lightheadedness, cough, congestion, shortness of breath, WASHINGTON, PND, near syncope, weight loss or gain.    METS: 9.9  RCRI: 0 points, 3.9%  risk for postoperative MACE     HTN - Lisinopril HOLD evening prior to surgery and morning of surgery       HLD - cont statin on dos     Pulmonary:  No pulmonary diagnosis, however patient is at increased risk of perioperative complications secondary to  None  Stop Bang score is 3 placing patient at intermediate risk for CHRIS  ARISCAT: <26 points, 1.6% risk of in-hospital postoperative pulmonary complication  PRODIGY: Low risk for opioid induced respiratory depression    **Pt provided with deep breathing exercises during PAT visit today**    Vascular/Hematology:  FVL - heterozygous - DVT/PE - LLE in 2022  Following with Dr. Canela (heme/onc) for this.    Pt to hold Eliquis per Dr. Canela and lovenox bridge per [facility] instructions as well; Last dose Eliquis 3/18 and to start Lovenox 3/19 am/ pm and 3/20 am dose only.  No Lovenox 24 hours before procedure. Pt aware.     Patient instructed to ambulate as soon as possible postoperatively to decrease thromboembolic risk.   Initiate mechanical DVT prophylaxis as soon as possible and initiate chemical prophylaxis when deemed safe from a bleeding  standpoint post surgery.     LABS: CBC ordered.  BMP and EKG reviewed ; done within last month. Lab results reviewed and unremarkable.    Caprini: 9    Risk assessment complete.  Patient is scheduled for a low surgical risk procedure.        Preoperative medication instructions were provided and reviewed with the patient.  Any additional testing or evaluation was explained to the patient.  Nothing by mouth instructions were discussed and patient's questions were answered prior to conclusion to this encounter.  Patient verbalized understanding of preoperative instructions given in preadmission testing; discharge instructions available in EMR.    This note was dictated by a speech recognition.  Minor errors may have been detected in a speech recognition.

## 2025-03-07 NOTE — PREPROCEDURE INSTRUCTIONS
Medication List            Accurate as of March 7, 2025  9:46 AM. Always use your most recent med list.                atorvastatin 20 mg tablet  Commonly known as: Lipitor  Take 1 tablet (20 mg) by mouth once daily.  Medication Adjustments for Surgery: Take on the morning of surgery     Eliquis 5 mg tablet  Generic drug: apixaban  Take 1 tablet (5 mg) by mouth 2 times a day.  Medication Adjustments for Surgery: Take last dose 2 days before surgery  Notes to patient: Last dose 3/18     enoxaparin 60 mg/0.6 mL syringe  Commonly known as: Lovenox  Every 12 hrs for 5 days prior to surgery  Medication Adjustments for Surgery: Do Not take on the morning of surgery  Notes to patient: Last dose is 24 hours before surgery; Last dose BEFORE 7am on 3/20/25     hydrOXYzine pamoate 50 mg capsule  Commonly known as: VistariL  Take 1 capsule (50 mg) by mouth 3 times a day as needed (anxiety, insomnia).  Medication Adjustments for Surgery: Take/Use as prescribed     latanoprost 0.005 % ophthalmic solution  Commonly known as: Xalatan  Medication Adjustments for Surgery: Take/Use as prescribed     lisinopril 5 mg tablet  Take 1 tablet (5 mg) by mouth once daily.  Notes to patient: HOLD evening prior to surgery and morning of surgery        multivitamin with minerals tablet  Additional Medication Adjustments for Surgery: Take last dose 7 days before surgery                          **Concerning above medication instructions, if medication is normally taken at night, continue normal schedule.**  **DO NOT TAKE NIGHT PRIOR AND MORNING OF SURGERY**    CONTACT SURGEON'S OFFICE IF YOU DEVELOP:  * Fever = 100.4 F   * New respiratory symptoms (e.g. cough, shortness of breath, respiratory distress, sore throat)  * Recent loss of taste or smell  *Flu like symptoms such as headache, fatigue or gastrointestinal symptoms  * You develop any open sores, shingles, burning or painful urination   AND/OR:  * You no longer wish to have the  surgery.  * Any other personal circumstances change that may lead to the need to cancel or defer this surgery.  *You were admitted to any hospital within one week of your planned procedure.    SMOKING:  *Quitting smoking can make a huge difference to your health and recovery from surgery.    *If you need help with quitting, call 1-903-QUIT-NOW.    THE DAY OF SURGERY:  *Do not eat any food after midnight the night before surgery.   *You must drink 13.5 ounces of clear liquids (i.e. water, black coffee (no milk or cream), tea, apple juice or electrolyte drinks (gatorade)) 2 hours before your arrival time.  *You may chew gum until 2 hours before your surgery    SURGICAL TIME  *You will be contacted between 2 p.m. and 6 p.m. the business day before your surgery with your arrival time.  *If you haven't received a call by 6pm, call [phone].  *Scheduled surgery times may change and you will be notified if this occurs-check your personal voicemail for any updates.    ON THE MORNING OF SURGERY:  *Wear comfortable, loose fitting clothing.   *Do not use moisturizers, creams, lotions or perfume.  *All jewelry and valuables should be left at home.  *Prosthetic devices such as contact lenses, hearing aids, dentures, eyelash extensions, hairpins and body piercing must be removed before surgery.    BRING WITH YOU:  *Photo ID and insurance card  *Current list of medicines and allergies  *Pacemaker/Defibrillator/Heart stent cards  *CPAP machine and mask  *Slings/splints/crutches  *Copy of your complete Advanced Directive/DHPOA-if applicable  *Neurostimulator implant remote    PARKING AND ARRIVAL:  *Check in at the Main Entrance desk and let them know you are here for surgery.  *You will be directed to the 2nd floor surgical waiting area.    AFTER OUTPATIENT SURGERY:  *A responsible adult MUST accompany you at the time of discharge and stay with you for 24 hours after your surgery.  *You may NOT drive yourself home after  surgery.  *You may use a taxi or ride sharing service ([employer], Uber) to return home ONLY if you are accompanied by a friend or family member.  *Instructions for resuming your medications will be provided by your surgeon.       Preoperative Deep Breathing Exercises  Why it is important to do deep breathing exercises before my surgery?  Deep breathing exercises strengthen your breathing muscles.  This helps you to recover after your surgery and decreases the chance of breathing complications.  How are the deep breathing exercises done?  Sit straight with your back supported.  Breathe in deeply and slowly through your nose. Your lower rib cage should expand and your abdomen may move forward.  Hold that breath for 3 to 5 seconds.  Breathe out through pursed lips, slowly and completely.  Rest and repeat 10 times every hour while awake.  Rest longer if you become dizzy or lightheaded.

## 2025-03-17 ENCOUNTER — TELEPHONE (OUTPATIENT)
Dept: UROLOGY | Facility: HOSPITAL | Age: 60
End: 2025-03-17
Payer: COMMERCIAL

## 2025-03-17 NOTE — TELEPHONE ENCOUNTER
Spoke with patient; reviewed instructions for procedure on 3-21-25.  Patient is on Eliquis and has instructions to stop per ordering provider.    Geraldine Wells LPN

## 2025-03-20 ENCOUNTER — ANESTHESIA EVENT (OUTPATIENT)
Dept: OPERATING ROOM | Facility: HOSPITAL | Age: 60
End: 2025-03-20
Payer: COMMERCIAL

## 2025-03-20 RX ORDER — ONDANSETRON HYDROCHLORIDE 2 MG/ML
4 INJECTION, SOLUTION INTRAVENOUS ONCE AS NEEDED
Status: CANCELLED | OUTPATIENT
Start: 2025-03-20

## 2025-03-20 RX ORDER — MEPERIDINE HYDROCHLORIDE 25 MG/ML
12.5 INJECTION INTRAMUSCULAR; INTRAVENOUS; SUBCUTANEOUS EVERY 10 MIN PRN
Status: CANCELLED | OUTPATIENT
Start: 2025-03-20

## 2025-03-20 RX ORDER — OXYCODONE HYDROCHLORIDE 5 MG/1
5 TABLET ORAL EVERY 4 HOURS PRN
Status: CANCELLED | OUTPATIENT
Start: 2025-03-20

## 2025-03-20 RX ORDER — LIDOCAINE HYDROCHLORIDE 10 MG/ML
0.1 INJECTION, SOLUTION EPIDURAL; INFILTRATION; INTRACAUDAL; PERINEURAL ONCE
Status: CANCELLED | OUTPATIENT
Start: 2025-03-20 | End: 2025-03-20

## 2025-03-21 ENCOUNTER — HOSPITAL ENCOUNTER (OUTPATIENT)
Facility: HOSPITAL | Age: 60
Setting detail: OUTPATIENT SURGERY
Discharge: HOME | End: 2025-03-21
Attending: STUDENT IN AN ORGANIZED HEALTH CARE EDUCATION/TRAINING PROGRAM | Admitting: STUDENT IN AN ORGANIZED HEALTH CARE EDUCATION/TRAINING PROGRAM
Payer: COMMERCIAL

## 2025-03-21 ENCOUNTER — ANESTHESIA (OUTPATIENT)
Dept: OPERATING ROOM | Facility: HOSPITAL | Age: 60
End: 2025-03-21
Payer: COMMERCIAL

## 2025-03-21 VITALS
RESPIRATION RATE: 14 BRPM | SYSTOLIC BLOOD PRESSURE: 101 MMHG | WEIGHT: 130.95 LBS | TEMPERATURE: 97.3 F | DIASTOLIC BLOOD PRESSURE: 77 MMHG | BODY MASS INDEX: 23.2 KG/M2 | HEIGHT: 63 IN | HEART RATE: 66 BPM | OXYGEN SATURATION: 97 %

## 2025-03-21 DIAGNOSIS — R97.20 PSA ELEVATION: ICD-10-CM

## 2025-03-21 PROBLEM — D68.51 FACTOR V LEIDEN (MULTI): Status: ACTIVE | Noted: 2025-03-21

## 2025-03-21 PROBLEM — Z79.01 ANTICOAGULANT LONG-TERM USE: Status: ACTIVE | Noted: 2025-03-21

## 2025-03-21 PROCEDURE — 3700000002 HC GENERAL ANESTHESIA TIME - EACH INCREMENTAL 1 MINUTE: Performed by: STUDENT IN AN ORGANIZED HEALTH CARE EDUCATION/TRAINING PROGRAM

## 2025-03-21 PROCEDURE — 88344 IMHCHEM/IMCYTCHM EA MLT ANTB: CPT | Performed by: PATHOLOGY

## 2025-03-21 PROCEDURE — 7100000002 HC RECOVERY ROOM TIME - EACH INCREMENTAL 1 MINUTE: Performed by: STUDENT IN AN ORGANIZED HEALTH CARE EDUCATION/TRAINING PROGRAM

## 2025-03-21 PROCEDURE — 76872 US TRANSRECTAL: CPT | Performed by: STUDENT IN AN ORGANIZED HEALTH CARE EDUCATION/TRAINING PROGRAM

## 2025-03-21 PROCEDURE — 55700 PR PROSTATE NEEDLE BIOPSY ANY APPROACH: CPT | Performed by: STUDENT IN AN ORGANIZED HEALTH CARE EDUCATION/TRAINING PROGRAM

## 2025-03-21 PROCEDURE — 3600000002 HC OR TIME - INITIAL BASE CHARGE - PROCEDURE LEVEL TWO: Performed by: STUDENT IN AN ORGANIZED HEALTH CARE EDUCATION/TRAINING PROGRAM

## 2025-03-21 PROCEDURE — 3700000001 HC GENERAL ANESTHESIA TIME - INITIAL BASE CHARGE: Performed by: STUDENT IN AN ORGANIZED HEALTH CARE EDUCATION/TRAINING PROGRAM

## 2025-03-21 PROCEDURE — 3600000007 HC OR TIME - EACH INCREMENTAL 1 MINUTE - PROCEDURE LEVEL TWO: Performed by: STUDENT IN AN ORGANIZED HEALTH CARE EDUCATION/TRAINING PROGRAM

## 2025-03-21 PROCEDURE — 2720000007 HC OR 272 NO HCPCS: Performed by: STUDENT IN AN ORGANIZED HEALTH CARE EDUCATION/TRAINING PROGRAM

## 2025-03-21 PROCEDURE — 7100000001 HC RECOVERY ROOM TIME - INITIAL BASE CHARGE: Performed by: STUDENT IN AN ORGANIZED HEALTH CARE EDUCATION/TRAINING PROGRAM

## 2025-03-21 PROCEDURE — 88305 TISSUE EXAM BY PATHOLOGIST: CPT | Mod: TC,AHULAB | Performed by: STUDENT IN AN ORGANIZED HEALTH CARE EDUCATION/TRAINING PROGRAM

## 2025-03-21 PROCEDURE — 76942 ECHO GUIDE FOR BIOPSY: CPT | Performed by: STUDENT IN AN ORGANIZED HEALTH CARE EDUCATION/TRAINING PROGRAM

## 2025-03-21 PROCEDURE — 7100000010 HC PHASE TWO TIME - EACH INCREMENTAL 1 MINUTE: Performed by: STUDENT IN AN ORGANIZED HEALTH CARE EDUCATION/TRAINING PROGRAM

## 2025-03-21 PROCEDURE — 7100000009 HC PHASE TWO TIME - INITIAL BASE CHARGE: Performed by: STUDENT IN AN ORGANIZED HEALTH CARE EDUCATION/TRAINING PROGRAM

## 2025-03-21 PROCEDURE — C1819 TISSUE LOCALIZATION-EXCISION: HCPCS | Performed by: STUDENT IN AN ORGANIZED HEALTH CARE EDUCATION/TRAINING PROGRAM

## 2025-03-21 PROCEDURE — 88305 TISSUE EXAM BY PATHOLOGIST: CPT | Performed by: PATHOLOGY

## 2025-03-21 PROCEDURE — 2500000004 HC RX 250 GENERAL PHARMACY W/ HCPCS (ALT 636 FOR OP/ED): Performed by: NURSE ANESTHETIST, CERTIFIED REGISTERED

## 2025-03-21 RX ORDER — PHENYLEPHRINE HYDROCHLORIDE 10 MG/ML
INJECTION INTRAVENOUS AS NEEDED
Status: DISCONTINUED | OUTPATIENT
Start: 2025-03-21 | End: 2025-03-21

## 2025-03-21 RX ORDER — MIDAZOLAM HYDROCHLORIDE 1 MG/ML
INJECTION INTRAMUSCULAR; INTRAVENOUS AS NEEDED
Status: DISCONTINUED | OUTPATIENT
Start: 2025-03-21 | End: 2025-03-21

## 2025-03-21 RX ORDER — PROPOFOL 10 MG/ML
INJECTION, EMULSION INTRAVENOUS AS NEEDED
Status: DISCONTINUED | OUTPATIENT
Start: 2025-03-21 | End: 2025-03-21

## 2025-03-21 RX ORDER — GENTAMICIN 40 MG/ML
INJECTION, SOLUTION INTRAMUSCULAR; INTRAVENOUS AS NEEDED
Status: DISCONTINUED | OUTPATIENT
Start: 2025-03-21 | End: 2025-03-21

## 2025-03-21 RX ORDER — LIDOCAINE HYDROCHLORIDE 20 MG/ML
INJECTION, SOLUTION EPIDURAL; INFILTRATION; INTRACAUDAL; PERINEURAL AS NEEDED
Status: DISCONTINUED | OUTPATIENT
Start: 2025-03-21 | End: 2025-03-21

## 2025-03-21 RX ORDER — HEPARIN SODIUM 5000 [USP'U]/ML
INJECTION, SOLUTION INTRAVENOUS; SUBCUTANEOUS AS NEEDED
Status: DISCONTINUED | OUTPATIENT
Start: 2025-03-21 | End: 2025-03-21

## 2025-03-21 RX ORDER — ONDANSETRON HYDROCHLORIDE 2 MG/ML
INJECTION, SOLUTION INTRAVENOUS AS NEEDED
Status: DISCONTINUED | OUTPATIENT
Start: 2025-03-21 | End: 2025-03-21

## 2025-03-21 RX ADMIN — PROPOFOL 100 MG: 10 INJECTION, EMULSION INTRAVENOUS at 07:33

## 2025-03-21 RX ADMIN — ONDANSETRON 4 MG: 2 INJECTION INTRAMUSCULAR; INTRAVENOUS at 07:38

## 2025-03-21 RX ADMIN — LIDOCAINE HYDROCHLORIDE 100 MG: 20 INJECTION, SOLUTION EPIDURAL; INFILTRATION; INTRACAUDAL; PERINEURAL at 07:30

## 2025-03-21 RX ADMIN — GENTAMICIN SULFATE 80 MG: 40 INJECTION, SOLUTION INTRAMUSCULAR; INTRAVENOUS at 07:32

## 2025-03-21 RX ADMIN — PHENYLEPHRINE HYDROCHLORIDE 100 MCG: 10 INJECTION INTRAVENOUS at 07:36

## 2025-03-21 RX ADMIN — PROPOFOL 20 MG: 10 INJECTION, EMULSION INTRAVENOUS at 07:40

## 2025-03-21 RX ADMIN — SODIUM CHLORIDE, SODIUM LACTATE, POTASSIUM CHLORIDE, AND CALCIUM CHLORIDE: .6; .31; .03; .02 INJECTION, SOLUTION INTRAVENOUS at 07:24

## 2025-03-21 RX ADMIN — HEPARIN SODIUM 5000 UNITS: 5000 INJECTION INTRAVENOUS; SUBCUTANEOUS at 07:33

## 2025-03-21 RX ADMIN — PROPOFOL 30 MG: 10 INJECTION, EMULSION INTRAVENOUS at 07:37

## 2025-03-21 RX ADMIN — MIDAZOLAM HYDROCHLORIDE 2 MG: 1 INJECTION, SOLUTION INTRAMUSCULAR; INTRAVENOUS at 07:22

## 2025-03-21 ASSESSMENT — PAIN SCALES - GENERAL
PAINLEVEL_OUTOF10: 0 - NO PAIN
PAINLEVEL_OUTOF10: 0 - NO PAIN
PAIN_LEVEL: 0
PAINLEVEL_OUTOF10: 0 - NO PAIN

## 2025-03-21 ASSESSMENT — COLUMBIA-SUICIDE SEVERITY RATING SCALE - C-SSRS
1. IN THE PAST MONTH, HAVE YOU WISHED YOU WERE DEAD OR WISHED YOU COULD GO TO SLEEP AND NOT WAKE UP?: NO
2. HAVE YOU ACTUALLY HAD ANY THOUGHTS OF KILLING YOURSELF?: NO
6. HAVE YOU EVER DONE ANYTHING, STARTED TO DO ANYTHING, OR PREPARED TO DO ANYTHING TO END YOUR LIFE?: NO

## 2025-03-21 ASSESSMENT — PAIN - FUNCTIONAL ASSESSMENT
PAIN_FUNCTIONAL_ASSESSMENT: 0-10

## 2025-03-21 NOTE — OP NOTE
Transperineal Prostate Biopsy Operative Note     Date: 3/21/2025  OR Location: [facility] A OR    Name: Jorge Cheng, : 1965, Age: 60 y.o., MRN: [id number], Sex: male    Diagnosis  Pre-op Diagnosis      * PSA elevation [R97.20] Post-op Diagnosis     * PSA elevation [R97.20]     Procedures  Transperineal Prostate Biopsy  95302 - UT PROSTATE NEEDLE BIOPSY ANY APPROACH      Surgeons      * Yazan Orellana - Primary    Resident/Fellow/Other Assistant:  Surgeons and Role:  * No surgeons found with a matching role *    Staff:   Circulator: Lara Wheeler Person: Emely    Anesthesia Staff: Anesthesiologist: Domingo Medellin MD  CRNA: OLI Minor-RENEA    Procedure Summary  Anesthesia: Monitor Anesthesia Care  ASA: II  Estimated Blood Loss: 5mL  Intra-op Medications: Administrations occurring from 0730 to 0820 on 25:  * No intraprocedure medications in log *        Specimen:   ID Type Source Tests Collected by Time   1 : LEFT APEX Tissue PROSTATE, BIOPSY, LEFT APEX SURGICAL PATHOLOGY EXAM Yazan Orellana MD [facility] 3/21/2025 0659   2 : RIGHT MID Tissue PROSTATE, BIOPSY, RIGHT MID SURGICAL PATHOLOGY EXAM Yazan Orellana MD [facility] 3/21/2025 0659   3 : RIGHT BASE Tissue PROSTATE, BIOPSY, RIGHT BASE SURGICAL PATHOLOGY EXAM Yazan Orellana MD [facility] 3/21/2025 0659   4 : RIGHT APEX Tissue PROSTATE, BIOPSY, RIGHT APEX SURGICAL PATHOLOGY EXAM Yazan Orellana MD [facility] 3/21/2025 0659   5 : LEFT MID Tissue PROSTATE, BIOPSY, LEFT MID SURGICAL PATHOLOGY EXAM Yazan Orellana MD [facility] 3/21/2025 0659   6 : LEFT BASE Tissue PROSTATE, BIOPSY, LEFT BASE SURGICAL PATHOLOGY EXAM Yazan Orellana MD [facility] 3/21/2025 0659   A : ELKIN #1  PROSTATE BIOPSY TARGETED ELKIN  Yazan Orellana MD [facility] 3/21/2025 0659   B : ELKIN #2  PROSTATE BIOPSY TARGETED ELKIN  Yazan Orellana MD [facility] 3/21/2025 0659                Indications: Jorge Cheng is an 60 y.o. male who is having surgery for PSA elevation [R97.20].     The patient was seen in  the preoperative area. The risks, benefits, complications, treatment options, non-operative alternatives, expected recovery and outcomes were discussed with the patient. The possibilities of reaction to medication, pulmonary aspiration, injury to surrounding structures, bleeding, recurrent infection, the need for additional procedures, failure to diagnose a condition, and creating a complication requiring transfusion or operation were discussed with the patient. The patient concurred with the proposed plan, giving informed consent.  The site of surgery was properly noted/marked if necessary per policy. The patient has been actively warmed in preoperative area. Preoperative antibiotics have been ordered and given within 1 hours of incision. Venous thrombosis prophylaxis have been ordered including bilateral sequential compression devices    Procedure Details:   Patient was consented in the preoperative area. Questions were answered. Allergies were reviewed. Perioperative antibiotics were administered. Patient was brought to the OR and placed in supine position on the OR table. A timeout was performed, all were in agreement. Patient underwent MAC anesthesia without complication. He was repositioned in left lateral decubitus.     The Uronav machine was positioned over the patient.     Preoperative MRI was matched to real-time ultrasound using the Uronav. We started with a standard template biopsy of 12 core biopsies from bilateral base, mid, and apex of prostate in lateral and medial positions. The Uronav was used to target 2 region of interest. 4 cores were taken from each of the region of interest.     PS 37gs     The ultrasound probe was removed. Pressure was held on the rectum until hemostasis was achieved. This concluded the procedure. Patient was awoken from anesthesia without complication and transferred to PACU in stable condition.     Complications:  None; patient tolerated the procedure well.    Disposition:  PACU - hemodynamically stable.  Condition: stable      Fu in 3 weeks with path results         Attending Attestation:     Yazan Orellana  Phone Number: [phone]

## 2025-03-21 NOTE — INTERVAL H&P NOTE
H&P reviewed. The patient was examined and there are no changes to the H&P.    59 y.o. male who presents for the above condition,  We had a very long and extensive discussion with the patient regarding elevated PSA. I explained to him the pathophysiology, differential diagnosis, risk factor, associated conditions, and management. I explained to him that elevated PSA could be either due to BPH, prostate infection or inflammation or prostate adenocarcinoma. We discussed the need to do a work-up to rule out any underlying prostate adenocarcinoma in the form of MR fusion biopsy if his MRI is positive or regular TRUS biopsy of the MRI is negative or we cannot do an MRI of the prostate. We discussed at length the risk, benefit, potential complication, adverse events of prostate biopsy including pain, discomfort, urinary retention, hematuria, pneumaturia, hematospermia. Explained to him that there is a 2% to 5% risk of complicated urinary infection and urosepsis. Explained to him indicates it happens, he will need to be admitted for IV antibiotic for 2 to 3 days at the hospital.      - Provided pt with biopsy intervention and observation options.  - Pt elected to move forward with biopsy at this time despite high CVD risk from stopping Eliquis    I discussed with him at length that PSA monitoring is an option for him.     - Pt obtained PCP and oncology clearance to stop Eliquis 2 days before, the day of, and 2 days after biopsy.    Plan  MRI fusion Bx of the prostate

## 2025-03-21 NOTE — H&P
59 y.o. male who presents to [facility] for a follow-up visit with MR prostate results regarding his  elevated PSA. He was referred by his PCP Dr. Edmonds. His most recent PSA conducted on 11/06/2024 ws 4.03 ng/mL. Prior PSA levels were 2.55 ng/mL (07/08/2023), 2.11 ng/mL (07/16/2022), and 3.20 ng/mL (06/19/2020). PI-RADS 4 lesion on MR prostate conducted 01/10/2025.     The patient is currently taking Eliquis with a history of blood clots.     The most recent MR prostate, conducted on 01/10/2025, revealed:  1.  Bilateral patchy and nodular areas in the posterolateral  peripheral zone meeting criteria for PI-RADS 4, measuring up to 1.4  cm on the right and 1.0 cm on the left. No evidence of extracapsular  disease.      PI-RADS 4 - High (clinically significant cancer is likely to be  present).        Review of Systems     All systems were reviewed. Anything negative was noted in the HPI.     Objective   Physical Exam     General: Well developed, well nourished, alert and cooperative, appears in no acute distress   Eyes: Non-injected conjunctiva, sclera clear, no proptosis   Cardiac: Extremities are warm and well perfused. No edema, cyanosis or pallor   Lungs: Breathing is easy, non-labored. Speaking in clear and complete sentences. Normal diaphragmatic movement   MSK: Ambulatory with steady gait, unassisted   Neuro: Alert and oriented to person, place, and time   Psych: Demonstrates good judgment and reason, without hallucinations, abnormal affect or abnormal behaviors   Skin: No obvious lesions, no rashes       No CVA tenderness bilaterally   No suprapubic pain or discomfort         Medical History        Past Medical History:   Diagnosis Date    Abnormal findings on diagnostic imaging of other specified body structures 07/02/2020     Abnormal CXR    Abnormal levels of other serum enzymes 07/02/2020     Elevated lipase    Encounter for follow-up examination after completed treatment for conditions other than malignant  neoplasm 03/07/2022     Hospital discharge follow-up    Other disorders of electrolyte and fluid balance, not elsewhere classified 07/02/2020     Electrolyte abnormality    Other microscopic hematuria 07/05/2020     Microhematuria    Other specified abnormal findings of blood chemistry 07/02/2020     Elevated platelet count    Other specified abnormal findings of blood chemistry 07/08/2022     Elevated LFTs    Personal history of diseases of the blood and blood-forming organs and certain disorders involving the immune mechanism 07/02/2020     History of anemia    Personal history of other diseases of the circulatory system 07/02/2020     History of hypertension    Personal history of other specified conditions 03/07/2022     History of tachycardia               Surgical History         Past Surgical History:   Procedure Laterality Date    OTHER SURGICAL HISTORY   06/23/2020     Appendectomy                  Assessment/Plan  Elevated PSA, PI-RADS 4 lesion on MR     59 y.o. male who presents for the above condition,  We had a very long and extensive discussion with the patient regarding elevated PSA. I explained to him the pathophysiology, differential diagnosis, risk factor, associated conditions, and management. I explained to him that elevated PSA could be either due to BPH, prostate infection or inflammation or prostate adenocarcinoma. We discussed the need to do a work-up to rule out any underlying prostate adenocarcinoma in the form of MR fusion biopsy if his MRI is positive or regular TRUS biopsy of the MRI is negative or we cannot do an MRI of the prostate. We discussed at length the risk, benefit, potential complication, adverse events of prostate biopsy including pain, discomfort, urinary retention, hematuria, pneumaturia, hematospermia. Explained to him that there is a 2% to 5% risk of complicated urinary infection and urosepsis. Explained to him indicates it happens, he will need to be admitted for IV  antibiotic for 2 to 3 days at the hospital.      - Provided pt with biopsy intervention and observation options.  - Pt elected to move forward with biopsy at this time.     - Pt reports he is currently taking Eliquis  - Pt will obtain PCP and oncology clearance to stop Eliquis 2 days before, the day of, and 2 days after biopsy.     Plan:  - MR fusion biopsy after obtaining PCP and Oncology clearance     E&M visit today is associated with current or anticipated ongoing medical care services related to a patient's single, serious condition or a complex condition.

## 2025-03-21 NOTE — ANESTHESIA PREPROCEDURE EVALUATION
Patient: Jorge Mahoneyorell    Procedure Information       Date/Time: 03/21/25 0730    Procedure: Transperineal Prostate Biopsy (Prostate)    Location: [facility] OR 04 / Virtual [facility] A OR    Surgeons: Yazan Orellana MD MPH            Relevant Problems   Anesthesia (within normal limits)      Cardiac   (+) HLD (hyperlipidemia)   (+) Primary hypertension      Pulmonary  Ex smoker quit 2018   (+) Pulmonary embolism      Neuro  Insomnia     (+) Anxiety      GI (within normal limits)      /Renal (within normal limits)      Liver   (+) Liver cyst   (+) Liver lesion      Endocrine (within normal limits)      Hematology   (+) Anticoagulant long-term use   (+) Deep vein thrombosis (DVT) of left lower extremity (Multi) (2022)   (+) Factor V Leiden (Multi)      HEENT   (+) Glaucoma       Clinical information reviewed:   Tobacco  Allergies  Meds   Med Hx  Surg Hx   Fam Hx  Soc Hx        NPO Detail:  NPO/Void Status  Carbohydrate Drink Given Prior to Surgery? : N  Date of Last Liquid: 03/21/25  Time of Last Liquid: 0400  Date of Last Solid: 03/20/25  Time of Last Solid: 1200  Last Intake Type: Clear fluids  Time of Last Void: 0400         Physical Exam    Airway  Mallampati: II     Cardiovascular    Dental    Pulmonary    Abdominal            Anesthesia Plan    History of general anesthesia?: yes  History of complications of general anesthesia?: no    ASA 3     MAC     intravenous induction   Anesthetic plan and risks discussed with patient.

## 2025-03-21 NOTE — ANESTHESIA POSTPROCEDURE EVALUATION
Patient: Jorge Mahoneyoreljeremi    Procedure Summary       Date: 03/21/25 Room / Location: [facility] A OR 04 / Virtual [facility] OR    Anesthesia Start: 0725 Anesthesia Stop: 0751    Procedure: Transperineal Prostate Biopsy (Prostate) Diagnosis:       PSA elevation      (PSA elevation [R97.20])    Surgeons: Yazan Orellana MD MPH Responsible Provider: Domingo Medellin MD    Anesthesia Type: MAC ASA Status: 3            Anesthesia Type: MAC    Vitals Value Taken Time   BP 94/52 03/21/25 0750   Temp 36.3 °C (97.3 °F) 03/21/25 0747   Pulse 97 03/21/25 0758   Resp 16 03/21/25 0747   SpO2 95 % 03/21/25 0753   Vitals shown include unfiled device data.    Anesthesia Post Evaluation    Patient location during evaluation: bedside  Patient participation: complete - patient cannot participate  Level of consciousness: awake  Pain score: 0  Pain management: adequate  Airway patency: patent  Cardiovascular status: acceptable and hemodynamically stable  Respiratory status: acceptable and nonlabored ventilation  Hydration status: acceptable  Postoperative Nausea and Vomiting: none        No notable events documented.

## 2025-03-24 ASSESSMENT — PAIN SCALES - GENERAL: PAINLEVEL_OUTOF10: 0 - NO PAIN

## 2025-03-26 ENCOUNTER — HOSPITAL ENCOUNTER (OUTPATIENT)
Dept: RADIOLOGY | Facility: CLINIC | Age: 60
Discharge: HOME | End: 2025-03-26
Payer: COMMERCIAL

## 2025-03-26 DIAGNOSIS — E78.5 HYPERLIPIDEMIA, UNSPECIFIED HYPERLIPIDEMIA TYPE: ICD-10-CM

## 2025-03-26 DIAGNOSIS — I10 PRIMARY HYPERTENSION: ICD-10-CM

## 2025-03-26 DIAGNOSIS — I25.10 CORONARY ARTERY CALCIFICATION: ICD-10-CM

## 2025-03-26 PROCEDURE — 75571 CT HRT W/O DYE W/CA TEST: CPT

## 2025-03-27 NOTE — PROGRESS NOTES
Subjective   Patient ID: Jorge Cheng is a 60 y.o. male    [facility]  60 y.o. male who presents to [facility] for a follow-up visit with elevated PSA. S/P MR fusion biopsy 03/21/2025 which revealed ***  He was referred by his PCP Dr. Edmonds. His most recent PSA conducted on 11/06/2024 ws 4.03 ng/mL. Prior PSA levels were 2.55 ng/mL (07/08/2023), 2.11 ng/mL (07/16/2022), and 3.20 ng/mL (06/19/2020). PI-RADS 4 lesion on MR prostate conducted 01/10/2025.    The patient is currently taking Eliquis with a history of blood clots.    Today, he ***    The most recent MR fusion biopsy, conducted on 03/21/2025, revealed:  ***          Review of Systems    All systems were reviewed. Anything negative was noted in the HPI.    Objective   Physical Exam    General: Well developed, well nourished, alert and cooperative, appears in no acute distress   Eyes: Non-injected conjunctiva, sclera clear, no proptosis   Cardiac: Extremities are warm and well perfused. No edema, cyanosis or pallor   Lungs: Breathing is easy, non-labored. Speaking in clear and complete sentences. Normal diaphragmatic movement   MSK: Ambulatory with steady gait, unassisted   Neuro: Alert and oriented to person, place, and time   Psych: Demonstrates good judgment and reason, without hallucinations, abnormal affect or abnormal behaviors   Skin: No obvious lesions, no rashes       No CVA tenderness bilaterally   No suprapubic pain or discomfort       Past Medical History:   Diagnosis Date    DVT (deep venous thrombosis) (Multi)     left leg, Bilateral PE remote.    Factor V Leiden (Multi)     and Prothrombin gene mutations    H/O ETOH abuse     sober since 2/2022    Hyperlipidemia     Hypertension     Other specified abnormal findings of blood chemistry 07/02/2020    Elevated platelet count 11/6/2024 230    Other specified abnormal findings of blood chemistry 07/08/2022    Elevated LFTs, 8/7/2024 AST 20, ALT 18, PHOS 61    Personal history of diseases of the blood  and blood-forming organs and certain disorders involving the immune mechanism 07/02/2020    History of anemia, 15.1/46.8 H&H 11/6/2024    Personal history of other specified conditions 03/07/2022    History of tachycardia    Vision loss     left eye, Glaucoma         Past Surgical History:   Procedure Laterality Date    OTHER SURGICAL HISTORY  1979    Appendectomy         Assessment/Plan   Elevated PSA, PI-RADS 4 lesion on MR    60 y.o. male who presents for the above condition,  We had a very long and extensive discussion with the patient regarding elevated PSA. I explained to him the pathophysiology, differential diagnosis, risk factor, associated conditions, and management. I explained to him that elevated PSA could be either due to BPH, prostate infection or inflammation or prostate adenocarcinoma. We discussed the need to do a work-up to rule out any underlying prostate adenocarcinoma in the form of MR fusion biopsy if his MRI is positive or regular TRUS biopsy of the MRI is negative or we cannot do an MRI of the prostate. We discussed at length the risk, benefit, potential complication, adverse events of prostate biopsy including pain, discomfort, urinary retention, hematuria, pneumaturia, hematospermia. Explained to him that there is a 2% to 5% risk of complicated urinary infection and urosepsis. Explained to him indicates it happens, he will need to be admitted for IV antibiotic for 2 to 3 days at the hospital.          Plan:  - ***        E&M visit today is associated with current or anticipated ongoing medical care services related to a patient's single, serious condition or a complex condition.     4/1/2025    Scribe Attestation  By signing my name below, IDelma Scribe attest that this documentation has been prepared under the direction and in the presence of Dr. Yazan Orellana.

## 2025-04-01 ENCOUNTER — APPOINTMENT (OUTPATIENT)
Dept: UROLOGY | Facility: HOSPITAL | Age: 60
End: 2025-04-01
Payer: COMMERCIAL

## 2025-04-03 ENCOUNTER — OFFICE VISIT (OUTPATIENT)
Dept: UROLOGY | Facility: HOSPITAL | Age: 60
End: 2025-04-03
Payer: COMMERCIAL

## 2025-04-03 DIAGNOSIS — R97.20 ELEVATED PSA: Primary | ICD-10-CM

## 2025-04-03 LAB
LAB AP ASR DISCLAIMER: NORMAL
LAB AP BLOCK FOR ADDITIONAL STUDIES: NORMAL
LABORATORY COMMENT REPORT: NORMAL
PATH REPORT.FINAL DX SPEC: NORMAL
PATH REPORT.GROSS SPEC: NORMAL
PATH REPORT.RELEVANT HX SPEC: NORMAL
PATH REPORT.TOTAL CANCER: NORMAL

## 2025-04-03 PROCEDURE — 99211 OFF/OP EST MAY X REQ PHY/QHP: CPT | Performed by: STUDENT IN AN ORGANIZED HEALTH CARE EDUCATION/TRAINING PROGRAM

## 2025-04-07 NOTE — PROGRESS NOTES
Subjective   Patient ID: Jorge Cheng is a 60 y.o. male    HPI  60 y.o. male who presents to [facility] for a follow-up visit with elevated PSA. S/P MR fusion biopsy 03/21/2025 which revealed Prostatic adenocarcinoma, Jayde score 3+3=6 (Grade group 1). He was referred by his PCP Dr. Edmonds. His most recent PSA conducted on 11/06/2024 ws 4.03 ng/mL. Prior PSA levels were 2.55 ng/mL (07/08/2023), 2.11 ng/mL (07/16/2022), and 3.20 ng/mL (06/19/2020). PI-RADS 4 lesion on MR prostate conducted 01/10/2025.    The patient is currently taking Eliquis with a history of blood clots.    Today, he ***    The most recent MR fusion biopsy, conducted on 03/21/2025, revealed:  A. Prostate, left apex, biopsy:  -- Atypical small acinar proliferation (ASAP)     B. Prostate, right mid, biopsy:  -- Benign prostatic tissue     C. Prostate, right base, biopsy:  -- Benign prostatic tissue     D. Prostate, right apex, biopsy:  -- Benign prostatic tissue with focal chronic inflammation     E. Prostate, left mid, biopsy:  -- Rare atypical glands; inadequate for further characterization     F. Prostate, left base, biopsy:  -- Prostatic adenocarcinoma, Kegley score 3+3=6 (Grade group 1), involving one of two cores and less than 5% of the specimen     G. Prostate, region of interest #3, biopsy:  -- Benign prostatic tissue with focal chronic inflammation     H. Prostate, region of interest #2, biopsy:  -- Prostatic adenocarcinoma, Jayde score 3+3=6 (Grade group 1), involving two of three cores and approximately 5% of the specimen     Note: A PIN4 immunohistochemical cocktail stain (p63, [id number], AMACR) supports the above diagnosis. All controls stain appropriately.     I. Prostate, region of interest #1, biopsy:  -- Benign prostatic tissue  -- Portion of benign seminal vesicle/ejaculatory duct tissue      Review of Systems    All systems were reviewed. Anything negative was noted in the HPI.    Objective   Physical Exam    General:  Well developed, well nourished, alert and cooperative, appears in no acute distress   Eyes: Non-injected conjunctiva, sclera clear, no proptosis   Cardiac: Extremities are warm and well perfused. No edema, cyanosis or pallor   Lungs: Breathing is easy, non-labored. Speaking in clear and complete sentences. Normal diaphragmatic movement   MSK: Ambulatory with steady gait, unassisted   Neuro: Alert and oriented to person, place, and time   Psych: Demonstrates good judgment and reason, without hallucinations, abnormal affect or abnormal behaviors   Skin: No obvious lesions, no rashes       No CVA tenderness bilaterally   No suprapubic pain or discomfort       Past Medical History:   Diagnosis Date    DVT (deep venous thrombosis) (Multi)     left leg, Bilateral PE remote.    Factor V Leiden (Multi)     and Prothrombin gene mutations    H/O ETOH abuse     sober since 2/2022    Hyperlipidemia     Hypertension     Other specified abnormal findings of blood chemistry 07/02/2020    Elevated platelet count 11/6/2024 230    Other specified abnormal findings of blood chemistry 07/08/2022    Elevated LFTs, 8/7/2024 AST 20, ALT 18, PHOS 61    Personal history of diseases of the blood and blood-forming organs and certain disorders involving the immune mechanism 07/02/2020    History of anemia, 15.1/46.8 H&H 11/6/2024    Personal history of other specified conditions 03/07/2022    History of tachycardia    Vision loss     left eye, Glaucoma         Past Surgical History:   Procedure Laterality Date    OTHER SURGICAL HISTORY  1979    Appendectomy         Assessment/Plan   Elevated PSA, PI-RADS 4 lesion on MR, Jayde score 3+3=6 (GG1)    60 y.o. male who presents for the above condition,  We had a very long and extensive discussion with the patient regarding elevated PSA. I explained to him the pathophysiology, differential diagnosis, risk factor, associated conditions, and management. I explained to him that elevated PSA could be  either due to BPH, prostate infection or inflammation or prostate adenocarcinoma. We discussed the need to do a work-up to rule out any underlying prostate adenocarcinoma in the form of MR fusion biopsy if his MRI is positive or regular TRUS biopsy of the MRI is negative or we cannot do an MRI of the prostate. We discussed at length the risk, benefit, potential complication, adverse events of prostate biopsy including pain, discomfort, urinary retention, hematuria, pneumaturia, hematospermia. Explained to him that there is a 2% to 5% risk of complicated urinary infection and urosepsis. Explained to him indicates it happens, he will need to be admitted for IV antibiotic for 2 to 3 days at the hospital.      I reviewed with the patient the epidemiology and natural history of prostate cancer, including the different grades and stages including the [place] scores and the newer grade group classifications. I explained to him that with his focal lesion on MRI and we need to do a metastatic work-up. I explained to him that such disease is typically managed with ether robotic assisted laparoscopic radical prostatectomy, external beam radiation, seeds implants, HIFU.  I explained to him however several other variables can come into play, including the age of the patient, the trend of PSA change, the volume of disease, the comorbidities, and the genomic profile to predict the chance of progression in the future. I explained to him that my preference is against active surveillance with delayed intervention. we discussed the risk, benefit, step-by-step procedure, adverse events, side effects, outcomes, quality of life, and survival benefit of each therapy for the prostate cancer.  More specifically discussed urinary incontinence and rectal dysfunction and oncologic outcomes for each.      Plan:  - ***        E&M visit today is associated with current or anticipated ongoing medical care services related to a patient's single,  serious condition or a complex condition.     4/10/2025    Scribe Attestation  By signing my name below, I, Daria Corrigan attest that this documentation has been prepared under the direction and in the presence of Dr. Yazan Orellana.

## 2025-04-10 ENCOUNTER — APPOINTMENT (OUTPATIENT)
Dept: UROLOGY | Facility: HOSPITAL | Age: 60
End: 2025-04-10
Payer: COMMERCIAL

## 2025-04-10 DIAGNOSIS — C61 PROSTATE CANCER (MULTI): Primary | ICD-10-CM

## 2025-04-10 PROCEDURE — 99215 OFFICE O/P EST HI 40 MIN: CPT | Performed by: STUDENT IN AN ORGANIZED HEALTH CARE EDUCATION/TRAINING PROGRAM

## 2025-04-10 PROCEDURE — G2211 COMPLEX E/M VISIT ADD ON: HCPCS | Performed by: STUDENT IN AN ORGANIZED HEALTH CARE EDUCATION/TRAINING PROGRAM

## 2025-04-10 NOTE — PROGRESS NOTES
Subjective   Patient ID: Jorge Cheng is a 60 y.o. male    HPI  60 y.o. male who presents to [facility] for a follow-up visit with elevated PSA. S/P MR fusion biopsy 03/21/2025 which revealed Prostatic adenocarcinoma, Jayde score 3+3=6 (Grade group 1). He was referred by his PCP Dr. Edmonds. His most recent PSA conducted on 11/06/2024 ws 4.03 ng/mL. Prior PSA levels were 2.55 ng/mL (07/08/2023), 2.11 ng/mL (07/16/2022), and 3.20 ng/mL (06/19/2020). PI-RADS 4 lesion on MR prostate conducted 01/10/2025.    The patient is currently taking Eliquis with a history of blood clots.      The most recent MR fusion biopsy, conducted on 03/21/2025, revealed:  A. Prostate, left apex, biopsy:  -- Atypical small acinar proliferation (ASAP)     B. Prostate, right mid, biopsy:  -- Benign prostatic tissue     C. Prostate, right base, biopsy:  -- Benign prostatic tissue     D. Prostate, right apex, biopsy:  -- Benign prostatic tissue with focal chronic inflammation     E. Prostate, left mid, biopsy:  -- Rare atypical glands; inadequate for further characterization     F. Prostate, left base, biopsy:  -- Prostatic adenocarcinoma, Jayde score 3+3=6 (Grade group 1), involving one of two cores and less than 5% of the specimen     G. Prostate, region of interest #3, biopsy:  -- Benign prostatic tissue with focal chronic inflammation     H. Prostate, region of interest #2, biopsy:  -- Prostatic adenocarcinoma, [place] score 3+3=6 (Grade group 1), involving two of three cores and approximately 5% of the specimen     Note: A PIN4 immunohistochemical cocktail stain (p63, [id number], AMACR) supports the above diagnosis. All controls stain appropriately.     I. Prostate, region of interest #1, biopsy:  -- Benign prostatic tissue  -- Portion of benign seminal vesicle/ejaculatory duct tissue      Review of Systems    All systems were reviewed. Anything negative was noted in the HPI.    Objective   Physical Exam    General: Well developed,  well nourished, alert and cooperative, appears in no acute distress   Eyes: Non-injected conjunctiva, sclera clear, no proptosis   Cardiac: Extremities are warm and well perfused. No edema, cyanosis or pallor   Lungs: Breathing is easy, non-labored. Speaking in clear and complete sentences. Normal diaphragmatic movement   MSK: Ambulatory with steady gait, unassisted   Neuro: Alert and oriented to person, place, and time   Psych: Demonstrates good judgment and reason, without hallucinations, abnormal affect or abnormal behaviors   Skin: No obvious lesions, no rashes       No CVA tenderness bilaterally   No suprapubic pain or discomfort       Past Medical History:   Diagnosis Date    DVT (deep venous thrombosis) (Multi)     left leg, Bilateral PE remote.    Factor V Leiden (Multi)     and Prothrombin gene mutations    H/O ETOH abuse     sober since 2/2022    Hyperlipidemia     Hypertension     Other specified abnormal findings of blood chemistry 07/02/2020    Elevated platelet count 11/6/2024 230    Other specified abnormal findings of blood chemistry 07/08/2022    Elevated LFTs, 8/7/2024 AST 20, ALT 18, PHOS 61    Personal history of diseases of the blood and blood-forming organs and certain disorders involving the immune mechanism 07/02/2020    History of anemia, 15.1/46.8 H&H 11/6/2024    Personal history of other specified conditions 03/07/2022    History of tachycardia    Vision loss     left eye, Glaucoma         Past Surgical History:   Procedure Laterality Date    OTHER SURGICAL HISTORY  1979    Appendectomy         Assessment/Plan   Elevated PSA, PI-RADS 4 lesion on MR, [place] score 3+3=6 (GG1), hx of PE DVT, Factor 5 Leiden heterozygous      60 y.o. male who presents for the above condition,  We had a very long and extensive discussion with the patient regarding Prostate cancer.     I reviewed with the patient the epidemiology and natural history of prostate cancer, including the different grades and stages  including the Jayde scores and the newer grade group classifications. I explained to him that with his focal lesion on MRI and we need to do a metastatic work-up. I explained to him that such disease is typically managed with ether robotic assisted laparoscopic radical prostatectomy, external beam radiation, seeds implants, HIFU.  I explained to him however several other variables can come into play, including the age of the patient, the trend of PSA change, the volume of disease, the comorbidities, and the genomic profile to predict the chance of progression in the future. I explained to him that my preference is active surveillance with delayed intervention. we discussed the risk, benefit, step-by-step procedure, adverse events, side effects, outcomes, quality of life, and survival benefit of each therapy for the prostate cancer.  More specifically discussed urinary incontinence and rectal dysfunction and oncologic outcomes for each.    We reviewed the patient's prostate biopsy, which confirms he has a low risk prostate cancer. We discussed options of active surveillance with PSA check every 6 months with a plan to obtain biopsy if PSA elevates, as well as robotic surgery and radiation therapy. He will decide and let us know his decision.    Plan:  - Follow up in 6 weeks with PSA.   - We will refer him to Dr. Marquis (radiation oncologist) and Dr. Noble (surgeon) based on the PSA result.      E&M visit today is associated with current or anticipated ongoing medical care services related to a patient's single, serious condition or a complex condition.     4/10/2025    Scribe Attestation  By signing my name below, ILelia Scribe attest that this documentation has been prepared under the direction and in the presence of Dr. Yazan Orellana.

## 2025-04-15 ENCOUNTER — TELEPHONE (OUTPATIENT)
Dept: UROLOGY | Facility: HOSPITAL | Age: 60
End: 2025-04-15
Payer: COMMERCIAL

## 2025-04-15 NOTE — TELEPHONE ENCOUNTER
----- Message from Nicol QUIROZ sent at 4/15/2025  1:37 PM EDT -----  Regarding: Patient question  Patient had a Biopsy 3/21 and he needs to know when he should get his PSA as he is worried the PSA will come out higher after the biopsy. Also, I had to cancel his appointment with Esteban on 7/10 as he will be on PTO. Patient is seeing radiation late June and Aide 7/3. Does Esteban want to see him after all that or before ?     Thanks !!

## 2025-04-15 NOTE — TELEPHONE ENCOUNTER
Spoke with patient; he will follow up with  us after he sees Dr. Noble per Dr. Orellana.    Geraldine Wells LPN

## 2025-04-22 LAB — SCAN RESULT: NORMAL

## 2025-04-30 NOTE — PROGRESS NOTES
Staff Physician: Ester Patrick MD PhD  Referring Physician: Yazan Mi MD M*  Date of Service: 5/1/2025  Patient name: Jorge Cheng   MRN: [id number]    RADIATION ONCOLOGY CONSULT NOTE    IDENTIFYING DATA:  DIAGNOSIS: Newly diagnosed, localized   Cancer Staging   Prostate cancer (Multi)  Staging form: Prostate, AJCC 8th Edition  - Clinical stage from 3/21/2025: cT1c, cN0, Grade Group: 1 - Signed by Ester Patrick MD PhD on 5/1/2025    DISEASE STATE: No prior treatment  DISEASE STATUS: Treatment pending work-up  Problem List Items Addressed This Visit       Prostate cancer (Multi)    Relevant Orders    Referral to Radiation Oncology     Other Visit Diagnoses         Weak urinary stream    -  Primary    Relevant Medications    tamsulosin (Flomax) 0.4 mg 24 hr capsule        Mr. Jorge Cheng is a 60-year-old with PMHx of Factor V Leiden and prothrombin disorder on lifelong anticoagulation, presents with newly diagnosed low risk prostate adenocarcinoma, referred by Yazan Patrick MD M*, for evaluation and discussion of treatment recommendations.    HISTORY OF PRESENT ILLNESS:    1/10/2025: MRI Prostate shows a 37g prostate with x2 PIRADS 4 lesions: 1.4cm within the right posterolateral midgland PZ, 1.0cm lesion within the left posterolateral PZ just left of midline. No REYNA, no SVI, no pelvic LAD.     3/21/2025: Prostate MR fusion biopsy resulted with GG1 (1/12 cores+, 1/3 targeted). GG1 disease comprises ~5% of tissue. Decipher 0.19.    PSA Trend:  - 6/19/2020: 3.20  - 7/16/2022: 2.11  - 7/8/2023: 2.55  - 11/6/2024: 4.03    Today, Mr. Jorge Cheng is here by himself.  Symptomatically, he reports:    1.  Full independence in activities of daily living. Works full time, self-employed in real estate   2.  Urinary function: IPSS 14; mild incomplete emptying (2), frequency (3), intermittency (2), urgency (1), weak stream (2), straining (1), and nocturia x2. We discussed the use of medications for  "urinary symptoms such as these. Patient reported that Dr Orellana mentioned prescribing a medication, but is waiting until after his next PSA draw to start. We discussed that we often prescribe flomax for patients with these symptoms.  3.  Bowel function: normal. Cologuard 2024, negative.  4.  Normal appetite. No unintentional weight gain or loss.   5.  Pain score: 0/10   6.  He denies a personal history of MI or stroke. Denies history of diabetes.    PAST MEDICAL HISTORY:  Medical History[1]  PAST SURGICAL HISTORY:  Surgical History[2]  ALLERGIES:  Allergies[3]  MEDICATIONS:  Current Medications[4]   SOCIAL HISTORY:  Social History     Tobacco Use    Smoking status: Former     Current packs/day: 0.00     Average packs/day: 1 pack/day for 29.0 years (29.0 ttl pk-yrs)     Types: Cigarettes     Start date:      Quit date:      Years since quittin.3    Smokeless tobacco: Never   Substance Use Topics    Alcohol use: Not Currently     FAMILY HISTORY:  Family History[5]    REVIEW OF SYSTEMS:  Please refer to RN note.    PHYSICAL EXAMINATION:  /81 (BP Location: Left arm, Patient Position: Sitting, BP Cuff Size: Adult)   Pulse 76   Temp 36 °C (96.8 °F) (Temporal)   Resp 16   Ht 1.6 m (5' 2.99\")   Wt 59.3 kg (130 lb 12.8 oz)   SpO2 100%   BMI 23.18 kg/m²     Constitutional: well developed, no distress, alert & oriented, cooperative  Respiratory: normal work of breathing  Cardiovascular: regular rate  Extremities: no clubbing or edema  Psychological: normal affect      CTCAE (v5) ADVERSE EVENTS:  Toxicity Assessment          2025    09:00   Toxicity Assessment   Treatment Site Prostate RT   Hematuria Grade 0   Urinary Incontinence Grade 0   Erectile Dysfunction Grade 0   Urinary Frequency Grade 1   Urinary Retention Grade 1   Urinary Tract Obstruction Grade 1   Urinary Tract Pain Grade 0   Urinary Urgency Grade 1       PERFORMANCE STATUS:  KPS/ECO, Fully active, able to carry on all " pre-disease performed without restriction (ECOG equivalent 0)    LABORATORY AND IMAGING DATA:  Imaging: All imaging was personally reviewed and interpreted in clinic. Findings as per HPI and EMR.    1/10/2025 MRI Prostate       FINDINGS:  PROSTATE VOLUME:  The prostate measures  4.4 cm x  3.8 cm  x  4.3 cm in right-to-left,  anterior-posterior and craniocaudal dimension.      Prostate weight is estimated at 37g.      PROSTATE PARENCHYMA:  There is heterogeneous enlargement of the transition zone, consistent  with benign prostatic hyperplasia. In the posterolateral aspects of  the peripheral zone of the midgland there are multiple patchy and  nodular areas of decreased ADC values with increased signal on  diffusion-weighted imaging, including a 1.4 cm ovoid area in the  posterolateral aspect of the right midgland peripheral zone and a 1.0  cm nodular area in the left posterolateral peripheral zone, just left  of midline, midgland to apex. No focal bulge or evidence of  extracapsular disease.      EXTRACAPSULAR EXTENSION:  None.      SEMINAL VESICLES:  Within normal limits.      PELVIC LYMPH NODES:  No abnormally enlarged pelvic lymph nodes are identified.      PERITONEUM:  No free or loculated fluid collections are evident in the pelvis.      OTHER ORGANS:  Within normal limits.      BONES:  No focal lesions are noted in the bone.      Exam Quality:  Is T2WI weighted imaging of diagnostic quality:  Yes. T2WI  assessment:  Adequate. Is DWI of diagnostic quality:  Yes. DWI  assessment:  Adequate. Is DCE of diagnostic quality:  Yes. DCE  assessment:  Adequate. PI-QUAL score:  Two or more sequences  independently are of diagnostic quality Comments:      IMPRESSION:  1.  Bilateral patchy and nodular areas in the posterolateral  peripheral zone meeting criteria for PI-RADS 4, measuring up to 1.4  cm on the right and 1.0 cm on the left. No evidence of extracapsular  disease.      PI-RADS 4 - High (clinically significant  "cancer is likely to be  present).    Laboratory/Pathology:  All pertinent labs and pathology were personally reviewed and interpreted in clinic. Findings as per HPI and EMR.  Lab Results   Component Value Date    PSASCREEN 4.03 (H) 11/06/2024    PSASCREEN 2.55 07/08/2023    PSASCREEN 2.11 07/16/2022    PSASCREEN 3.20 06/19/2020     No results found for: \"TESTOSTERONE\", \"TESTOTOTMS\"      Lab Results   Component Value Date    BUN 12 02/06/2025    CREATININE 1.02 02/06/2025    EGFR 85 02/06/2025     02/06/2025    K 3.7 02/06/2025     02/06/2025    CO2 20 02/06/2025    CALCIUM 9.6 02/06/2025        3/21/2025 Prostate biopsy:    FINAL DIAGNOSIS   A. Prostate, left apex, biopsy:  -- Atypical small acinar proliferation (ASAP)     B. Prostate, right mid, biopsy:  -- Benign prostatic tissue     C. Prostate, right base, biopsy:  -- Benign prostatic tissue     D. Prostate, right apex, biopsy:  -- Benign prostatic tissue with focal chronic inflammation     E. Prostate, left mid, biopsy:  -- Rare atypical glands; inadequate for further characterization     F. Prostate, left base, biopsy:  -- Prostatic adenocarcinoma, Jayde score 3+3=6 (Grade group 1), involving one of two cores and less than 5% of the specimen     G. Prostate, region of interest #3, biopsy:  -- Benign prostatic tissue with focal chronic inflammation     H. Prostate, region of interest #2, biopsy:  -- Prostatic adenocarcinoma, Jayde score 3+3=6 (Grade group 1), involving two of three cores and approximately 5% of the specimen     Note: A PIN4 immunohistochemical cocktail stain ([id number], [id number], AMACR) supports the above diagnosis. All controls stain appropriately.     I. Prostate, region of interest #1, biopsy:  -- Benign prostatic tissue  -- Portion of benign seminal vesicle/ejaculatory duct tissue       IMPRESSION:  60 year-old gentleman with newly diagnosed prostate adenocarcinoma, cT1c cN0 (by MRI), iPSA 4.03, GG1 (1/12 cores+, 1/3 targeted+) " with GG1 disease comprising ~5% tissue, Decipher 0.19.   PMHx of Factor V Leiden and prothrombin disorder on lifelong anticoagulation    He falls into low risk prostate cancer based on his clinical-pathologic factors. He is not in any pain.    Per SSA tables, he has at least 10 years of life expectancy.   We discussed the recent update to the ProtecT randomized trial that compared 15-year outcomes for prostate cancer patients managed with active surveillance or definitive therapy and found that, while overall survival was no different, there were higher rates of distant metastases and disease progression with active surveillance (NEJ 2023), albeit the AS on that trial was less rigorous. We discussed that AS involves a secondary confirmatory biopsy, followed by routine PSA check, with surveillance imaging and repeat biopsies no more than once a year or if concern for progression by PSA.      He is also a candidate for definitive management, such as surgery or radiation given that he has low risk disease. I explained the logistics of radiation therapy including CT simulation, the use of image guidance to improve prostate localization, and the recommendation of fiducials and spacer gel placement to reduce potential toxic rectal toxicities.  Potential acute and long-term side effects of radiation were discussed, including acute and late side effects, including toxicities to bladder, rectum, bowel, urethra, erectile dysfunction, infertility and risk of secondary malignancy.  I discussed various EBRT treatment regimens, including moderate hypofractionation over 20 treatments, and ultra hypofractionation over 5 treatments. His IPSS is 14. We will prescribe flomax for his urinary symptoms as this will likely provide some relief and does not impact the PSA that will be drawn soon.    We recommend continued active surveillance with Dr. Orellana. He is also meeting Dr. Noble to discuss RP. He will let us know his decision.      PLAN:  - Patient will continue seeing Dr. Orellana for active surveillance  - Flomax for urinary symptoms    Thank you for the opportunity to participate in the care of this kind patient.    Ray Orlando MD  PGY-2, Radiation Oncology Resident    ---  I personally saw and evaluated the patient. I personally obtained key and critical portions of the history and physical exam and personally reviewed and interpreted imaging and laboratory data. I reviewed and edited the resident's documentation, and discussed the patient with the resident. I agree with the Dr. Orlando's plan as documented above.     Ester Patrick MD PhD  , Radiation Oncology         [1]   Past Medical History:  Diagnosis Date    Anemia NA    Blood disorder 2022    DVT (deep venous thrombosis) (Multi)     left leg, Bilateral PE remote.    Elevated PSA 11/24    Factor V Leiden (Multi)     and Prothrombin gene mutations    H/O ETOH abuse     sober since 2/2022    Hyperlipidemia     Hypertension     Other specified abnormal findings of blood chemistry 07/02/2020    Elevated platelet count 11/6/2024 230    Other specified abnormal findings of blood chemistry 07/08/2022    Elevated LFTs, 8/7/2024 AST 20, ALT 18, PHOS 61    Personal history of diseases of the blood and blood-forming organs and certain disorders involving the immune mechanism 07/02/2020    History of anemia, 15.1/46.8 H&H 11/6/2024    Personal history of other specified conditions 03/07/2022    History of tachycardia    Prostate cancer (Multi)     Vision loss     left eye, Glaucoma   [2]   Past Surgical History:  Procedure Laterality Date    APPENDECTOMY  1979    OTHER SURGICAL HISTORY  1979    Appendectomy   [3] No Known Allergies  [4]   Current Outpatient Medications:     atorvastatin (Lipitor) 20 mg tablet, Take 1 tablet (20 mg) by mouth once daily., Disp: 90 tablet, Rfl: 1    Eliquis 5 mg tablet, Take 1 tablet (5 mg) by mouth 2 times a day., Disp: 180  tablet, Rfl: 3    latanoprost (Xalatan) 0.005 % ophthalmic solution, Administer 1 drop into both eyes once daily at bedtime., Disp: , Rfl:     lisinopril 5 mg tablet, Take 1 tablet (5 mg) by mouth once daily., Disp: 90 tablet, Rfl: 1    multivitamin with minerals (multivit-min-iron fum-folic ac) tablet, Take 1 tablet by mouth once daily., Disp: , Rfl:     enoxaparin (Lovenox) 60 mg/0.6 mL syringe, Every 12 hrs for 5 days prior to surgery (Patient not taking: Reported on 3/7/2025), Disp: 10 each, Rfl: 1    hydrOXYzine pamoate (VistariL) 50 mg capsule, Take 1 capsule (50 mg) by mouth 3 times a day as needed (anxiety, insomnia). (Patient not taking: Reported on 2/28/2025), Disp: 90 capsule, Rfl: 0    tamsulosin (Flomax) 0.4 mg 24 hr capsule, Take 1 capsule (0.4 mg) by mouth once daily., Disp: 90 capsule, Rfl: 0  [5]   Family History  Problem Relation Name Age of Onset    No Known Problems Mother      Dementia Father Jorge     Heart disease Father Jorge     Hypertension Father Jorge     Breast cancer Sister

## 2025-05-01 ENCOUNTER — APPOINTMENT (OUTPATIENT)
Dept: UROLOGY | Facility: HOSPITAL | Age: 60
End: 2025-05-01
Payer: COMMERCIAL

## 2025-05-01 ENCOUNTER — HOSPITAL ENCOUNTER (OUTPATIENT)
Dept: RADIATION ONCOLOGY | Facility: CLINIC | Age: 60
Setting detail: RADIATION/ONCOLOGY SERIES
Discharge: HOME | End: 2025-05-01
Payer: COMMERCIAL

## 2025-05-01 VITALS
SYSTOLIC BLOOD PRESSURE: 123 MMHG | RESPIRATION RATE: 16 BRPM | HEIGHT: 63 IN | BODY MASS INDEX: 23.18 KG/M2 | WEIGHT: 130.8 LBS | HEART RATE: 76 BPM | OXYGEN SATURATION: 100 % | DIASTOLIC BLOOD PRESSURE: 81 MMHG | TEMPERATURE: 96.8 F

## 2025-05-01 DIAGNOSIS — R39.12 WEAK URINARY STREAM: Primary | ICD-10-CM

## 2025-05-01 DIAGNOSIS — C61 PROSTATE CANCER (MULTI): ICD-10-CM

## 2025-05-01 PROCEDURE — 99204 OFFICE O/P NEW MOD 45 MIN: CPT | Performed by: STUDENT IN AN ORGANIZED HEALTH CARE EDUCATION/TRAINING PROGRAM

## 2025-05-01 PROCEDURE — 99214 OFFICE O/P EST MOD 30 MIN: CPT | Mod: GC | Performed by: STUDENT IN AN ORGANIZED HEALTH CARE EDUCATION/TRAINING PROGRAM

## 2025-05-01 RX ORDER — TAMSULOSIN HYDROCHLORIDE 0.4 MG/1
0.4 CAPSULE ORAL DAILY
Qty: 90 CAPSULE | Refills: 0 | Status: SHIPPED | OUTPATIENT
Start: 2025-05-01 | End: 2025-07-30

## 2025-05-01 SDOH — ECONOMIC STABILITY: FOOD INSECURITY: WITHIN THE PAST 12 MONTHS, THE FOOD YOU BOUGHT JUST DIDN'T LAST AND YOU DIDN'T HAVE MONEY TO GET MORE.: NEVER TRUE

## 2025-05-01 SDOH — ECONOMIC STABILITY: FOOD INSECURITY: WITHIN THE PAST 12 MONTHS, YOU WORRIED THAT YOUR FOOD WOULD RUN OUT BEFORE YOU GOT MONEY TO BUY MORE.: NEVER TRUE

## 2025-05-01 ASSESSMENT — ENCOUNTER SYMPTOMS
NERVOUS/ANXIOUS: 1
FREQUENCY: 1

## 2025-05-01 ASSESSMENT — PAIN SCALES - GENERAL: PAINLEVEL_OUTOF10: 0-NO PAIN

## 2025-05-01 NOTE — PROGRESS NOTES
Radiation Oncology Nursing Note    IPSS (International Prostate Symptom Score):   14 / 35, bother 3  In the past month:   Incomplete Emptying - How often have you had the sensation of not emptying you bladder?   2 (less than half the time)  Frequency - How often have you had to urinate less than every 2 hours?   3 (about half the time)  Intermittency - How often have you found you stopped and started again several times when you urinated?   2 (less than half the time)  Urgency - How often have you found it difficult to postpone urination?   1 (less than 1 in 5 times)  Weak stream - How often have you had a weak urinary stream?   2 (less than half the time)  Straining - How often have you had to strain to start urination?   1 (less than 1 in 5 times)  Nocturia - How many times did you typically get up at night to urinate?   2 (2 times)  Quality of Life due to Urinary Symptoms  If you were to spend the rest of your life with your urinary condition just the way it is now, how would you feel about that?   3 (Mixed)     - He is not experiencing urinary incontinence.      - He is not experiencing dysuria, hematuria, flank pain.     Sexual function:   - Quality of erections during the last 4 weeks: 4 = Firm enough for intercourse  - Use of erectile dysfunction medications:  None    Bowel function:    - Denies hematochezia or pain.    - He does have a history of hemorrhoids. One time   - He does not have a history of inflammatory bowel disease (Crohn's, Ulcerative Colitis).    Prior Radiotherapy:  No  Radiation Treatments       No radiation treatments to show. (Treatments may have been administered in another system.)            Current Systemic Treatment:  No     Presence of Pacemaker or ICD:  No    History of Autoimmune or Connective Tissue Disorders:  No    Pain: The patient's current pain level was assessed.  They report currently having a pain of 0 out of 10.  They feel their pain is not under control without the use  of pain medications.    Review of Systems:  Review of Systems   Eyes:         Glaucoma   Genitourinary:  Positive for frequency and nocturia.         See IPSS   Psychiatric/Behavioral:  The patient is nervous/anxious (chronic per patient).    All other systems reviewed and are negative.

## 2025-05-05 ASSESSMENT — PROMIS GLOBAL HEALTH SCALE
RATE_GENERAL_HEALTH: GOOD
EMOTIONAL_PROBLEMS: SOMETIMES
CARRYOUT_SOCIAL_ACTIVITIES: GOOD
RATE_AVERAGE_PAIN: 2
RATE_QUALITY_OF_LIFE: VERY GOOD
RATE_AVERAGE_FATIGUE: MILD
RATE_PHYSICAL_HEALTH: GOOD
CARRYOUT_PHYSICAL_ACTIVITIES: COMPLETELY
RATE_SOCIAL_SATISFACTION: FAIR
RATE_MENTAL_HEALTH: FAIR

## 2025-05-12 ENCOUNTER — APPOINTMENT (OUTPATIENT)
Dept: PRIMARY CARE | Facility: CLINIC | Age: 60
End: 2025-05-12
Payer: COMMERCIAL

## 2025-05-12 VITALS
BODY MASS INDEX: 22.02 KG/M2 | OXYGEN SATURATION: 98 % | DIASTOLIC BLOOD PRESSURE: 77 MMHG | HEART RATE: 62 BPM | SYSTOLIC BLOOD PRESSURE: 117 MMHG | WEIGHT: 129 LBS | HEIGHT: 64 IN | RESPIRATION RATE: 14 BRPM

## 2025-05-12 DIAGNOSIS — E78.5 HYPERLIPIDEMIA, UNSPECIFIED HYPERLIPIDEMIA TYPE: ICD-10-CM

## 2025-05-12 DIAGNOSIS — I10 PRIMARY HYPERTENSION: ICD-10-CM

## 2025-05-12 PROCEDURE — 99214 OFFICE O/P EST MOD 30 MIN: CPT | Performed by: FAMILY MEDICINE

## 2025-05-12 PROCEDURE — 3074F SYST BP LT 130 MM HG: CPT | Performed by: FAMILY MEDICINE

## 2025-05-12 PROCEDURE — 3008F BODY MASS INDEX DOCD: CPT | Performed by: FAMILY MEDICINE

## 2025-05-12 PROCEDURE — 3078F DIAST BP <80 MM HG: CPT | Performed by: FAMILY MEDICINE

## 2025-05-12 PROCEDURE — 1036F TOBACCO NON-USER: CPT | Performed by: FAMILY MEDICINE

## 2025-05-12 RX ORDER — LISINOPRIL 5 MG/1
5 TABLET ORAL DAILY
Qty: 90 TABLET | Refills: 1 | Status: SHIPPED | OUTPATIENT
Start: 2025-05-12

## 2025-05-12 NOTE — PROGRESS NOTES
"Subjective   Patient ID: Jorge Cheng is a 60 y.o. male who presents for Med Refill.    HPI   Has HLD (goal LDL <70), HTN.  Taking med(s) as directed.  Requests refills.    Review of Systems  No other complaints.     Objective   /77   Pulse 62   Resp 14   Ht 1.613 m (5' 3.5\")   Wt 58.5 kg (129 lb)   SpO2 98%   BMI 22.49 kg/m²     Physical Exam  Constitutional:       General: He is not in acute distress.     Appearance: He is normal weight.   Cardiovascular:      Rate and Rhythm: Normal rate and regular rhythm.      Heart sounds: Normal heart sounds. No murmur heard.     No friction rub. No gallop.   Pulmonary:      Effort: Pulmonary effort is normal.      Breath sounds: Normal breath sounds. No wheezing, rhonchi or rales.   Psychiatric:         Mood and Affect: Mood normal.         Behavior: Behavior normal.     Assessment/Plan   Diagnoses and all orders for this visit:  Primary hypertension  -     Basic Metabolic Panel; Future  -     lisinopril 5 mg tablet; Take 1 tablet (5 mg) by mouth once daily.  Hyperlipidemia, unspecified hyperlipidemia type  -     Lipid Panel; Future    Fasting labs.  Refilled Lisinopril.  Will refill Atorvastatin after reviewing lab results.    F/U 6 months: Annual wellness visit.  "

## 2025-05-12 NOTE — PATIENT INSTRUCTIONS
Fasting labs.  Refilled Lisinopril.  Will refill Atorvastatin after reviewing lab results.    F/U 6 months: Annual wellness visit.    Lab services: [address]  Hours: M-F 7:15a-6:00p  Phone: [phone], Option 1

## 2025-05-13 DIAGNOSIS — E78.5 HYPERLIPIDEMIA, UNSPECIFIED HYPERLIPIDEMIA TYPE: ICD-10-CM

## 2025-05-13 LAB
ANION GAP SERPL CALCULATED.4IONS-SCNC: 12 MMOL/L (CALC) (ref 7–17)
BUN SERPL-MCNC: 22 MG/DL (ref 7–25)
BUN/CREAT SERPL: NORMAL (CALC) (ref 6–22)
CALCIUM SERPL-MCNC: 10 MG/DL (ref 8.6–10.3)
CHLORIDE SERPL-SCNC: 101 MMOL/L (ref 98–110)
CHOLEST SERPL-MCNC: 228 MG/DL
CHOLEST/HDLC SERPL: 2.8 (CALC)
CO2 SERPL-SCNC: 23 MMOL/L (ref 20–32)
CREAT SERPL-MCNC: 0.99 MG/DL (ref 0.7–1.3)
EGFRCR SERPLBLD CKD-EPI 2021: 88 ML/MIN/1.73M2
GLUCOSE SERPL-MCNC: 78 MG/DL (ref 65–99)
HDLC SERPL-MCNC: 81 MG/DL
LDLC SERPL CALC-MCNC: 129 MG/DL (CALC)
NONHDLC SERPL-MCNC: 147 MG/DL (CALC)
POTASSIUM SERPL-SCNC: 4.7 MMOL/L (ref 3.5–5.3)
SODIUM SERPL-SCNC: 136 MMOL/L (ref 135–146)
TRIGL SERPL-MCNC: 83 MG/DL

## 2025-05-13 RX ORDER — ATORVASTATIN CALCIUM 40 MG/1
40 TABLET, FILM COATED ORAL DAILY
Qty: 90 TABLET | Refills: 0 | Status: SHIPPED | OUTPATIENT
Start: 2025-05-13

## 2025-06-03 LAB — PSA SERPL-MCNC: 3.15 NG/ML

## 2025-06-23 NOTE — PROGRESS NOTES
"NPV     HISTORY OF PRESENT ILLNESS:   Jorge Cheng is a 60 y.o. male who is being seen today for prostate cancer.    PAST MEDICAL HISTORY:  Medical History[1]    PAST SURGICAL HISTORY:  Surgical History[2]     ALLERGIES:   Allergies[3]     MEDICATIONS:   Current Outpatient Medications   Medication Instructions    atorvastatin (LIPITOR) 40 mg, oral, Daily    Eliquis 5 mg, oral, 2 times daily    enoxaparin (Lovenox) 60 mg/0.6 mL syringe Every 12 hrs for 5 days prior to surgery    hydrOXYzine pamoate (VISTARIL) 50 mg, oral, 3 times daily PRN    latanoprost (Xalatan) 0.005 % ophthalmic solution 1 drop, Nightly    lisinopril 5 mg, oral, Daily    multivitamin with minerals (multivit-min-iron fum-folic ac) tablet 1 tablet, Daily    tamsulosin (FLOMAX) 0.4 mg, oral, Daily        PHYSICAL EXAM:  There were no vitals taken for this visit.  Constitutional: Patient appears well-developed and well-nourished. No distress.    Pulmonary/Chest: Effort normal. No respiratory distress.   Abdominal: Soft, ND NT  : WNL  Musculoskeletal: Normal range of motion.    Neurological: Alert and oriented to person, place, and time.  Psychiatric: Normal mood and affect. Behavior is normal. Thought content normal.      Labs:  No results found for: \"TESTOSTERONE\"  Lab Results   Component Value Date    PSA 3.15 06/02/2025     No components found for: \"CBC\"  Lab Results   Component Value Date    CREATININE 0.99 05/12/2025     No components found for: \"TESTOTMS\"  No results found for: \"TESTF\"    PVR:  AUA:   ALAN:    Imaging:   MRI prostate, 1/10/25:   IMPRESSION:  1.  Bilateral patchy and nodular areas in the posterolateral peripheral zone meeting criteria for PI-RADS 4, measuring up to 1.4 cm on the right and 1.0 cm on the left. No evidence of extracapsular disease.    Discussion:  Patient has been diagnosed with low risk prostate cancer on MRI and biopsy. He has elevated PSA of 3.15. He is otherwise healthy. He sees Dr. Patrick for this and was " recommended active surveillance, which I also think is the most reasonable option for him. Will consider prostate surgery or radiation treatment if the PSA rises above 10. Discussed r/b/a in detail. No family history of prostate cancer. Denies any dysuria, hematuria, bothersome urinary frequency, urgency, or flank pain. Patient denies any pushing or straining to urinate. He has no children and does not plan on having any. Will continue to monitor PSA every 3 months. If there is no sign of worsening, we will continue to monitor, otherwise will MRI and biopsy depending on the result.    AUA 21  ALAN 21    Assessment:      1. Prostate cancer (Multi)  PSA    PSA    CANCELED: PSA    CANCELED: PSA          Plan:   Will follow up every 6 months with PSA every 3 months  Next follow up in November with PSA in August and November  All questions and concerns were addressed. Patient verbalizes understanding and has no other questions at this time.     Scribe Attestation  By signing my name below, ILelia, Scribe   attest that this documentation has been prepared under the direction and in the presence of Bryn Noble MD.       [1]   Past Medical History:  Diagnosis Date    Anemia NA    Blood disorder 2022    DVT (deep venous thrombosis) (Multi)     left leg, Bilateral PE remote.    Elevated PSA 11/24    Factor V Leiden (Multi)     and Prothrombin gene mutations    H/O ETOH abuse     sober since 2/2022    Hyperlipidemia     Hypertension NA    Other specified abnormal findings of blood chemistry 07/02/2020    Elevated platelet count 11/6/2024 230    Other specified abnormal findings of blood chemistry 07/08/2022    Elevated LFTs, 8/7/2024 AST 20, ALT 18, PHOS 61    Personal history of diseases of the blood and blood-forming organs and certain disorders involving the immune mechanism 07/02/2020    History of anemia, 15.1/46.8 H&H 11/6/2024    Personal history of other specified conditions 03/07/2022    History of  tachycardia    Prostate cancer (Multi)     Vision loss     left eye, Glaucoma   [2]   Past Surgical History:  Procedure Laterality Date    APPENDECTOMY  1979    OTHER SURGICAL HISTORY  1979    Appendectomy    PROSTATE BIOPSY     [3] No Known Allergies

## 2025-07-03 ENCOUNTER — APPOINTMENT (OUTPATIENT)
Dept: UROLOGY | Facility: HOSPITAL | Age: 60
End: 2025-07-03
Payer: COMMERCIAL

## 2025-07-03 DIAGNOSIS — C61 PROSTATE CANCER (MULTI): Primary | ICD-10-CM

## 2025-07-03 PROCEDURE — 99214 OFFICE O/P EST MOD 30 MIN: CPT | Performed by: UROLOGY

## 2025-07-03 PROCEDURE — 99204 OFFICE O/P NEW MOD 45 MIN: CPT | Performed by: UROLOGY

## 2025-07-10 ENCOUNTER — APPOINTMENT (OUTPATIENT)
Dept: UROLOGY | Facility: HOSPITAL | Age: 60
End: 2025-07-10
Payer: COMMERCIAL

## 2025-07-22 ENCOUNTER — OFFICE VISIT (OUTPATIENT)
Dept: UROLOGY | Facility: HOSPITAL | Age: 60
End: 2025-07-22
Payer: COMMERCIAL

## 2025-07-22 DIAGNOSIS — C61 PROSTATE CANCER (MULTI): Primary | ICD-10-CM

## 2025-07-22 PROCEDURE — 99215 OFFICE O/P EST HI 40 MIN: CPT | Performed by: STUDENT IN AN ORGANIZED HEALTH CARE EDUCATION/TRAINING PROGRAM

## 2025-07-22 NOTE — PROGRESS NOTES
Subjective   Patient ID: Jorge Cheng is a 60 y.o. male    HPI  60 y.o. male who presents to [facility] for a follow-up visit with elevated PSA. S/P MR fusion biopsy 03/21/2025 which revealed Prostatic adenocarcinoma, Jayde score 3+3=6 (Grade group 1). He was referred by his PCP Dr. Edmonds. His most recent PSA conducted on 11/06/2024 ws 4.03 ng/mL. Prior PSA levels were 2.55 ng/mL (07/08/2023), 2.11 ng/mL (07/16/2022), and 3.20 ng/mL (06/19/2020). PI-RADS 4 lesion on MR prostate conducted 01/10/2025.    The patient is currently taking Eliquis with a history of blood clots.    The most recent PSA, conducted on 06/02/2025, revealed:  3.15 mg/mL      Review of Systems    All systems were reviewed. Anything negative was noted in the HPI.    Objective   Physical Exam    General: Well developed, well nourished, alert and cooperative, appears in no acute distress   Eyes: Non-injected conjunctiva, sclera clear, no proptosis   Cardiac: Extremities are warm and well perfused. No edema, cyanosis or pallor   Lungs: Breathing is easy, non-labored. Speaking in clear and complete sentences. Normal diaphragmatic movement   MSK: Ambulatory with steady gait, unassisted   Neuro: Alert and oriented to person, place, and time   Psych: Demonstrates good judgment and reason, without hallucinations, abnormal affect or abnormal behaviors   Skin: No obvious lesions, no rashes       No CVA tenderness bilaterally   No suprapubic pain or discomfort       Past Medical History:   Diagnosis Date    Anemia NA    Blood disorder 2022    DVT (deep venous thrombosis) (Multi)     left leg, Bilateral PE remote.    Elevated PSA 11/24    Factor V Leiden (Multi)     and Prothrombin gene mutations    H/O ETOH abuse     sober since 2/2022    Hyperlipidemia     Hypertension NA    Other specified abnormal findings of blood chemistry 07/02/2020    Elevated platelet count 11/6/2024 230    Other specified abnormal findings of blood chemistry 07/08/2022     Elevated LFTs, 8/7/2024 AST 20, ALT 18, PHOS 61    Personal history of diseases of the blood and blood-forming organs and certain disorders involving the immune mechanism 07/02/2020    History of anemia, 15.1/46.8 H&H 11/6/2024    Personal history of other specified conditions 03/07/2022    History of tachycardia    Prostate cancer (Multi)     Vision loss     left eye, Glaucoma       Past Surgical History:   Procedure Laterality Date    APPENDECTOMY  1979    OTHER SURGICAL HISTORY  1979    Appendectomy    PROSTATE BIOPSY         Assessment/Plan   Elevated PSA, PI-RADS 4 lesion on MR, Jeannette score 3+3=6 (GG1), hx of PE DVT, Factor 5 Leiden heterozygous      60 y.o. male who presents for the above condition,  We had a very long and extensive discussion with the patient regarding Prostate cancer.     I reviewed with the patient the epidemiology and natural history of prostate cancer, including the different grades and stages including the Jayde scores and the newer grade group classifications. I explained to him that with his focal lesion on MRI and we need to do a metastatic work-up. I explained to him that such disease is typically managed with ether robotic assisted laparoscopic radical prostatectomy, external beam radiation, seeds implants, HIFU.  I explained to him however several other variables can come into play, including the age of the patient, the trend of PSA change, the volume of disease, the comorbidities, and the genomic profile to predict the chance of progression in the future. I explained to him that my preference is active surveillance with delayed intervention. we discussed the risk, benefit, step-by-step procedure, adverse events, side effects, outcomes, quality of life, and survival benefit of each therapy for the prostate cancer.  More specifically discussed urinary incontinence and rectal dysfunction and oncologic outcomes for each.    We reviewed the patient's prostate biopsy, which confirms  he has a low risk prostate cancer. We discussed options of active surveillance with PSA check every 6 months with a plan to obtain biopsy if PSA elevates, as well as robotic surgery and radiation therapy. He will decide and let us know his decision.    - Provided pt with surgical intervention, radiology intervention, and active surveillance options.    Plan:  - Follow-up in 6 months with PSA      E&M visit today is associated with current or anticipated ongoing medical care services related to a patient's single, serious condition or a complex condition.     7/22/2025    Scribe Attestation  By signing my name below, I, Daria Corrigan attest that this documentation has been prepared under the direction and in the presence of Dr. Yazan Orellana.

## 2025-08-05 DIAGNOSIS — R39.12 WEAK URINARY STREAM: ICD-10-CM

## 2025-08-05 RX ORDER — TAMSULOSIN HYDROCHLORIDE 0.4 MG/1
0.4 CAPSULE ORAL DAILY
Qty: 90 CAPSULE | Refills: 0 | Status: SHIPPED | OUTPATIENT
Start: 2025-08-05 | End: 2025-11-03

## 2025-08-09 DIAGNOSIS — E78.5 HYPERLIPIDEMIA, UNSPECIFIED HYPERLIPIDEMIA TYPE: ICD-10-CM

## 2025-08-09 LAB
CHOLEST SERPL-MCNC: 170 MG/DL
CHOLEST/HDLC SERPL: 2.9 (CALC)
HDLC SERPL-MCNC: 58 MG/DL
LDLC SERPL CALC-MCNC: 94 MG/DL (CALC)
NONHDLC SERPL-MCNC: 112 MG/DL (CALC)
TRIGL SERPL-MCNC: 87 MG/DL

## 2025-08-09 RX ORDER — ATORVASTATIN CALCIUM 80 MG/1
80 TABLET, FILM COATED ORAL DAILY
Qty: 90 TABLET | Refills: 0 | Status: SHIPPED | OUTPATIENT
Start: 2025-08-09

## 2025-08-13 DIAGNOSIS — E78.5 HYPERLIPIDEMIA, UNSPECIFIED HYPERLIPIDEMIA TYPE: ICD-10-CM

## (undated) DEVICE — TOWEL, SURGICAL, NEURO, O/R, 16 X 26, BLUE, STERILE

## (undated) DEVICE — COVER, BACK TABLE, 65 X 90, HVY REINFORCED

## (undated) DEVICE — GLOVE, SURGICAL, PROTEXIS PI MICRO, 7.5, PF, LF

## (undated) DEVICE — NEEDLE GUIDE, BIOPSY, ULTRASOUND, SINGLE, UA 1322-S

## (undated) DEVICE — NEEDLE, BIOPSY, MAX CORE, 18G

## (undated) DEVICE — MARKER, SKIN, DUAL TIP INK W/9 LABEL AND REMOVABLE TIME OUT SLEEVE

## (undated) DEVICE — PROBE COVER, ULTRASOUND 8818

## (undated) DEVICE — PROBE HOLDER, URONAV BK 8808E/8818

## (undated) DEVICE — DRESSING, GAUZE, 16 PLY, 4 X 4 IN, STERILE

## (undated) DEVICE — DRESSING, NON-ADHERENT, TELFA, OUCHLESS, 3 X 8 IN, STERILE